# Patient Record
Sex: MALE | Race: WHITE | Employment: FULL TIME | URBAN - NONMETROPOLITAN AREA
[De-identification: names, ages, dates, MRNs, and addresses within clinical notes are randomized per-mention and may not be internally consistent; named-entity substitution may affect disease eponyms.]

---

## 2017-04-13 ENCOUNTER — OFFICE VISIT (OUTPATIENT)
Dept: PRIMARY CARE CLINIC | Age: 32
End: 2017-04-13
Payer: COMMERCIAL

## 2017-04-13 VITALS
BODY MASS INDEX: 31.49 KG/M2 | OXYGEN SATURATION: 97 % | RESPIRATION RATE: 18 BRPM | HEIGHT: 65 IN | WEIGHT: 189 LBS | DIASTOLIC BLOOD PRESSURE: 70 MMHG | SYSTOLIC BLOOD PRESSURE: 128 MMHG | HEART RATE: 85 BPM

## 2017-04-13 DIAGNOSIS — K52.9 CHRONIC DIARRHEA: Primary | ICD-10-CM

## 2017-04-13 DIAGNOSIS — Z76.89 ENCOUNTER TO ESTABLISH CARE WITH NEW DOCTOR: ICD-10-CM

## 2017-04-13 PROCEDURE — 99204 OFFICE O/P NEW MOD 45 MIN: CPT | Performed by: INTERNAL MEDICINE

## 2017-04-13 RX ORDER — COLESEVELAM 180 1/1
1875 TABLET ORAL
COMMUNITY
End: 2017-04-13 | Stop reason: SDUPTHER

## 2017-04-13 RX ORDER — LANOLIN ALCOHOL/MO/W.PET/CERES
10 CREAM (GRAM) TOPICAL NIGHTLY PRN
COMMUNITY

## 2017-04-13 RX ORDER — COLESEVELAM 180 1/1
1875 TABLET ORAL
Qty: 180 TABLET | Refills: 5 | Status: SHIPPED | OUTPATIENT
Start: 2017-04-13 | End: 2017-04-18 | Stop reason: SDUPTHER

## 2017-04-13 ASSESSMENT — ENCOUNTER SYMPTOMS
BACK PAIN: 0
DIARRHEA: 1
SHORTNESS OF BREATH: 0
VOMITING: 0
NAUSEA: 0
CONSTIPATION: 0

## 2017-04-18 RX ORDER — COLESEVELAM 180 1/1
1875 TABLET ORAL 2 TIMES DAILY WITH MEALS
Qty: 540 TABLET | Refills: 3 | Status: SHIPPED | OUTPATIENT
Start: 2017-04-18 | End: 2017-08-23 | Stop reason: SDUPTHER

## 2017-06-22 ENCOUNTER — OFFICE VISIT (OUTPATIENT)
Dept: PRIMARY CARE CLINIC | Age: 32
End: 2017-06-22
Payer: COMMERCIAL

## 2017-06-22 VITALS
RESPIRATION RATE: 18 BRPM | HEIGHT: 66 IN | HEART RATE: 80 BPM | WEIGHT: 188.8 LBS | BODY MASS INDEX: 30.34 KG/M2 | TEMPERATURE: 98.5 F | SYSTOLIC BLOOD PRESSURE: 128 MMHG | DIASTOLIC BLOOD PRESSURE: 70 MMHG | OXYGEN SATURATION: 98 %

## 2017-06-22 DIAGNOSIS — Z72.0 TOBACCO USE: Primary | ICD-10-CM

## 2017-06-22 PROCEDURE — 99213 OFFICE O/P EST LOW 20 MIN: CPT | Performed by: INTERNAL MEDICINE

## 2017-06-22 RX ORDER — VARENICLINE TARTRATE 25 MG
KIT ORAL
Qty: 53 TABLET | Refills: 1 | Status: SHIPPED | OUTPATIENT
Start: 2017-06-22 | End: 2018-02-28

## 2017-06-23 ASSESSMENT — ENCOUNTER SYMPTOMS
CONSTIPATION: 0
BACK PAIN: 0
VOMITING: 0
NAUSEA: 0
DIARRHEA: 1
SHORTNESS OF BREATH: 0

## 2017-08-23 RX ORDER — COLESEVELAM 180 1/1
1875 TABLET ORAL 2 TIMES DAILY WITH MEALS
Qty: 540 TABLET | Refills: 3 | Status: SHIPPED | OUTPATIENT
Start: 2017-08-23 | End: 2017-12-06 | Stop reason: SDUPTHER

## 2017-09-19 DIAGNOSIS — Z13.9 ENCOUNTER FOR SCREENING: Primary | ICD-10-CM

## 2017-12-06 ENCOUNTER — OFFICE VISIT (OUTPATIENT)
Dept: PRIMARY CARE CLINIC | Age: 32
End: 2017-12-06
Payer: COMMERCIAL

## 2017-12-06 VITALS
DIASTOLIC BLOOD PRESSURE: 68 MMHG | HEIGHT: 65 IN | HEART RATE: 97 BPM | SYSTOLIC BLOOD PRESSURE: 134 MMHG | TEMPERATURE: 98.2 F | WEIGHT: 202.4 LBS | BODY MASS INDEX: 33.72 KG/M2 | OXYGEN SATURATION: 98 %

## 2017-12-06 DIAGNOSIS — F51.01 PRIMARY INSOMNIA: ICD-10-CM

## 2017-12-06 DIAGNOSIS — K91.5 POST-CHOLECYSTECTOMY SYNDROME: ICD-10-CM

## 2017-12-06 DIAGNOSIS — Z72.0 TOBACCO USE: Primary | ICD-10-CM

## 2017-12-06 DIAGNOSIS — Z72.0 TOBACCO USE: ICD-10-CM

## 2017-12-06 LAB
ALBUMIN SERPL-MCNC: 4.6 G/DL (ref 3.5–5.2)
ALP BLD-CCNC: 66 U/L (ref 40–130)
ALT SERPL-CCNC: 18 U/L (ref 5–41)
ANION GAP SERPL CALCULATED.3IONS-SCNC: 12 MMOL/L (ref 7–19)
AST SERPL-CCNC: 20 U/L (ref 5–40)
BILIRUB SERPL-MCNC: 1.3 MG/DL (ref 0.2–1.2)
BILIRUBIN URINE: NEGATIVE
BLOOD, URINE: NEGATIVE
BUN BLDV-MCNC: 14 MG/DL (ref 6–20)
CALCIUM SERPL-MCNC: 9.3 MG/DL (ref 8.6–10)
CHLORIDE BLD-SCNC: 103 MMOL/L (ref 98–111)
CHOLESTEROL, TOTAL: 154 MG/DL (ref 160–199)
CLARITY: CLEAR
CO2: 27 MMOL/L (ref 22–29)
COLOR: YELLOW
CREAT SERPL-MCNC: 0.9 MG/DL (ref 0.5–1.2)
GFR NON-AFRICAN AMERICAN: >60
GLUCOSE BLD-MCNC: 95 MG/DL (ref 74–109)
GLUCOSE URINE: NEGATIVE MG/DL
HBA1C MFR BLD: 4.9 %
HDLC SERPL-MCNC: 63 MG/DL (ref 55–121)
KETONES, URINE: NEGATIVE MG/DL
LDL CHOLESTEROL CALCULATED: 70 MG/DL
LEUKOCYTE ESTERASE, URINE: NEGATIVE
NITRITE, URINE: NEGATIVE
PH UA: 6.5
POTASSIUM SERPL-SCNC: 4 MMOL/L (ref 3.5–5)
PROTEIN UA: NEGATIVE MG/DL
SODIUM BLD-SCNC: 142 MMOL/L (ref 136–145)
SPECIFIC GRAVITY UA: 1.02
TOTAL PROTEIN: 7.4 G/DL (ref 6.6–8.7)
TRIGL SERPL-MCNC: 103 MG/DL (ref 0–149)
UROBILINOGEN, URINE: 1 E.U./DL

## 2017-12-06 PROCEDURE — 99213 OFFICE O/P EST LOW 20 MIN: CPT | Performed by: INTERNAL MEDICINE

## 2017-12-06 RX ORDER — COLESEVELAM 180 1/1
1875 TABLET ORAL 2 TIMES DAILY WITH MEALS
Qty: 540 TABLET | Refills: 3 | Status: SHIPPED | OUTPATIENT
Start: 2017-12-06 | End: 2018-09-18 | Stop reason: SDUPTHER

## 2017-12-06 RX ORDER — TRAZODONE HYDROCHLORIDE 100 MG/1
100 TABLET ORAL NIGHTLY
Qty: 30 TABLET | Refills: 5 | Status: SHIPPED | OUTPATIENT
Start: 2017-12-06 | End: 2018-02-22 | Stop reason: SDUPTHER

## 2017-12-06 ASSESSMENT — ENCOUNTER SYMPTOMS
SHORTNESS OF BREATH: 0
NAUSEA: 0
BACK PAIN: 0
CONSTIPATION: 0
VOMITING: 0

## 2017-12-06 NOTE — PROGRESS NOTES
nightly as needed       No current facility-administered medications for this visit. No Known Allergies    Health Maintenance   Topic Date Due    HIV screen  07/12/2002    DTaP/Tdap/Td vaccine (1 - Tdap) 07/12/2006    Pneumococcal med risk (1 of 1 - PPSV23) 07/12/2006    Flu vaccine (1) 09/01/2017       Subjective:      Review of Systems   Constitutional: Negative for activity change and fever. Respiratory: Negative for shortness of breath. Cardiovascular: Negative for chest pain and leg swelling. Gastrointestinal: Positive for diarrhea. Negative for constipation, nausea and vomiting. Genitourinary: Negative for difficulty urinating and dysuria. Musculoskeletal: Negative for back pain and neck pain. Neurological: Negative for dizziness and light-headedness. Psychiatric/Behavioral: Positive for sleep disturbance. Objective:     Physical Exam   Constitutional: He is oriented to person, place, and time. He appears well-developed and well-nourished. HENT:   Head: Normocephalic and atraumatic. Mouth/Throat: Oropharynx is clear and moist.   Eyes: Conjunctivae are normal. Pupils are equal, round, and reactive to light. Cardiovascular: Normal rate, regular rhythm and normal heart sounds. Pulmonary/Chest: Effort normal and breath sounds normal.   Neurological: He is alert and oriented to person, place, and time. Skin: Skin is warm and dry. Vitals reviewed. /68 (Site: Left Arm, Position: Sitting)   Pulse 97   Temp 98.2 °F (36.8 °C)   Ht 5' 5\" (1.651 m)   Wt 202 lb 6.4 oz (91.8 kg)   SpO2 98%   BMI 33.68 kg/m²     Assessment:      1. Tobacco use  Comprehensive Metabolic Panel    Lipid Panel    Hemoglobin A1C    Urinalysis   2. Post-cholecystectomy syndrome  Comprehensive Metabolic Panel    Lipid Panel    Hemoglobin A1C    Urinalysis   3.  Primary insomnia  Comprehensive Metabolic Panel    Lipid Panel    Hemoglobin A1C    Urinalysis             Plan:      Return in about 1 year (around 12/6/2018). Patient Instructions   Trazodone 100 mg at bedtime as needed for sleep. Follow up again in one year. Congratulations on stopping smoking!!!!!!      Orders Placed This Encounter   Procedures    Comprehensive Metabolic Panel     Standing Status:   Future     Number of Occurrences:   1     Standing Expiration Date:   12/6/2018    Lipid Panel     Standing Status:   Future     Number of Occurrences:   1     Standing Expiration Date:   12/6/2018     Order Specific Question:   Is Patient Fasting?/# of Hours     Answer:   yes    Hemoglobin A1C     Standing Status:   Future     Number of Occurrences:   1     Standing Expiration Date:   12/6/2018    Urinalysis     Standing Status:   Future     Number of Occurrences:   1     Standing Expiration Date:   12/6/2018     Orders Placed This Encounter   Medications    colesevelam (WELCHOL) 625 MG tablet     Sig: Take 3 tablets by mouth 2 times daily (with meals)     Dispense:  540 tablet     Refill:  3    traZODone (DESYREL) 100 MG tablet     Sig: Take 1 tablet by mouth nightly     Dispense:  30 tablet     Refill:  5       Patient given educational materials - see patient instructions. Discussed use, benefit, and side effects of prescribed medications. All patient questions answered. Pt voiced understanding. Reviewed health maintenance. Instructed to continue current medications, diet and exercise. Patient agreed with treatment plan. Follow up as directed.      Electronically signed by Duarte Ortega DO on 1/13/2018 at 1:52 PM

## 2017-12-11 ENCOUNTER — TELEPHONE (OUTPATIENT)
Dept: PRIMARY CARE CLINIC | Age: 32
End: 2017-12-11

## 2018-01-13 ASSESSMENT — ENCOUNTER SYMPTOMS: DIARRHEA: 1

## 2018-02-22 RX ORDER — TRAZODONE HYDROCHLORIDE 100 MG/1
100 TABLET ORAL NIGHTLY
Qty: 90 TABLET | Refills: 3 | Status: SHIPPED | OUTPATIENT
Start: 2018-02-22

## 2018-02-28 ENCOUNTER — OFFICE VISIT (OUTPATIENT)
Dept: PRIMARY CARE CLINIC | Age: 33
End: 2018-02-28
Payer: COMMERCIAL

## 2018-02-28 ENCOUNTER — HOSPITAL ENCOUNTER (OUTPATIENT)
Dept: GENERAL RADIOLOGY | Age: 33
Discharge: HOME | End: 2018-02-28
Payer: COMMERCIAL

## 2018-02-28 VITALS
HEART RATE: 74 BPM | BODY MASS INDEX: 33.45 KG/M2 | DIASTOLIC BLOOD PRESSURE: 80 MMHG | SYSTOLIC BLOOD PRESSURE: 122 MMHG | OXYGEN SATURATION: 99 % | WEIGHT: 200.8 LBS | TEMPERATURE: 98.6 F | HEIGHT: 65 IN

## 2018-02-28 DIAGNOSIS — R10.11 RIGHT UPPER QUADRANT ABDOMINAL PAIN: ICD-10-CM

## 2018-02-28 DIAGNOSIS — R10.11 RIGHT UPPER QUADRANT ABDOMINAL PAIN: Primary | ICD-10-CM

## 2018-02-28 PROCEDURE — 74018 RADEX ABDOMEN 1 VIEW: CPT

## 2018-02-28 PROCEDURE — 99213 OFFICE O/P EST LOW 20 MIN: CPT | Performed by: FAMILY MEDICINE

## 2018-02-28 ASSESSMENT — ENCOUNTER SYMPTOMS: ABDOMINAL PAIN: 1

## 2018-02-28 ASSESSMENT — PATIENT HEALTH QUESTIONNAIRE - PHQ9
2. FEELING DOWN, DEPRESSED OR HOPELESS: 0
SUM OF ALL RESPONSES TO PHQ QUESTIONS 1-9: 0
1. LITTLE INTEREST OR PLEASURE IN DOING THINGS: 0
SUM OF ALL RESPONSES TO PHQ9 QUESTIONS 1 & 2: 0

## 2018-02-28 NOTE — PROGRESS NOTES
Constitutional: He is oriented to person, place, and time. He appears well-developed and well-nourished. No distress. HENT:   Head: Normocephalic and atraumatic. Right Ear: External ear normal.   Left Ear: External ear normal.   Eyes: Conjunctivae are normal. No scleral icterus. Neck: No JVD present. Carotid bruit is not present. Cardiovascular: Normal rate, regular rhythm and normal heart sounds. Exam reveals no friction rub. No murmur heard. Pulmonary/Chest: Effort normal and breath sounds normal. No respiratory distress. He has no wheezes. Abdominal: Soft. Bowel sounds are normal. He exhibits no distension and no mass. There is tenderness (mild) in the right upper quadrant. There is no rebound and no guarding. Musculoskeletal: Normal range of motion. Lymphadenopathy:     He has no cervical adenopathy. Right cervical: No superficial cervical and no posterior cervical adenopathy present. Left cervical: No superficial cervical and no posterior cervical adenopathy present. Neurological: He is alert and oriented to person, place, and time. No cranial nerve deficit. Skin: Skin is warm and dry. No rash noted. No erythema. Psychiatric: He has a normal mood and affect. Thought content normal.   Vitals reviewed. Assessment    ICD-10-CM ICD-9-CM    1. Right upper quadrant abdominal pain R10.11 789.01 Unclear Etiology. Will obtain XR ABDOMEN (KUB) (SINGLE AP VIEW) . May start something for constipation vs reflux. Course will be determined by xray results. Plan    No orders of the defined types were placed in this encounter. Orders Placed This Encounter   Procedures    XR ABDOMEN (KUB) (SINGLE AP VIEW)        Return in about 1 month (around 3/28/2018) for Abdominal pain. There are no Patient Instructions on file for this visit.

## 2018-03-06 ENCOUNTER — TELEPHONE (OUTPATIENT)
Dept: PRIMARY CARE CLINIC | Age: 33
End: 2018-03-06

## 2018-03-06 RX ORDER — OMEPRAZOLE 20 MG/1
20 CAPSULE, DELAYED RELEASE ORAL DAILY
Qty: 30 CAPSULE | Refills: 3 | Status: SHIPPED | OUTPATIENT
Start: 2018-03-06 | End: 2018-06-20 | Stop reason: SDUPTHER

## 2018-03-30 ENCOUNTER — TELEPHONE (OUTPATIENT)
Dept: PRIMARY CARE CLINIC | Age: 33
End: 2018-03-30

## 2018-03-30 RX ORDER — AMOXICILLIN 250 MG/1
250 CAPSULE ORAL 3 TIMES DAILY
Qty: 30 CAPSULE | Refills: 0 | Status: SHIPPED | OUTPATIENT
Start: 2018-03-30 | End: 2018-04-09

## 2018-03-30 NOTE — TELEPHONE ENCOUNTER
Patient called and daughter diagnosed with strep throat,given amoxicillin and now he is starting with sore throat,congestion.  escribed rx

## 2018-06-20 RX ORDER — OMEPRAZOLE 20 MG/1
20 CAPSULE, DELAYED RELEASE ORAL DAILY
Qty: 90 CAPSULE | Refills: 2 | Status: SHIPPED | OUTPATIENT
Start: 2018-06-20 | End: 2021-01-08

## 2018-09-18 RX ORDER — COLESEVELAM 180 1/1
1875 TABLET ORAL 2 TIMES DAILY WITH MEALS
Qty: 540 TABLET | Refills: 2 | Status: SHIPPED | OUTPATIENT
Start: 2018-09-18

## 2018-11-14 ENCOUNTER — OFFICE VISIT (OUTPATIENT)
Dept: RETAIL CLINIC | Facility: CLINIC | Age: 33
End: 2018-11-14

## 2018-11-14 VITALS
DIASTOLIC BLOOD PRESSURE: 73 MMHG | HEART RATE: 77 BPM | SYSTOLIC BLOOD PRESSURE: 123 MMHG | OXYGEN SATURATION: 97 % | TEMPERATURE: 98.3 F

## 2018-11-14 DIAGNOSIS — J06.9 ACUTE URI: Primary | ICD-10-CM

## 2018-11-14 LAB
EXPIRATION DATE: NORMAL
EXPIRATION DATE: NORMAL
FLUAV AG NPH QL: NEGATIVE
FLUBV AG NPH QL: NEGATIVE
INTERNAL CONTROL: NORMAL
INTERNAL CONTROL: NORMAL
Lab: NORMAL
Lab: NORMAL
S PYO AG THROAT QL: NEGATIVE

## 2018-11-14 PROCEDURE — 87804 INFLUENZA ASSAY W/OPTIC: CPT | Performed by: NURSE PRACTITIONER

## 2018-11-14 PROCEDURE — 99213 OFFICE O/P EST LOW 20 MIN: CPT | Performed by: NURSE PRACTITIONER

## 2018-11-14 PROCEDURE — 87880 STREP A ASSAY W/OPTIC: CPT | Performed by: NURSE PRACTITIONER

## 2018-11-14 RX ORDER — OMEPRAZOLE 20 MG/1
20 CAPSULE, DELAYED RELEASE ORAL DAILY
Refills: 2 | COMMUNITY
Start: 2018-09-15 | End: 2019-05-06 | Stop reason: SDUPTHER

## 2018-11-14 RX ORDER — COLESEVELAM 180 1/1
1875 TABLET ORAL 2 TIMES DAILY WITH MEALS
Refills: 2 | COMMUNITY
Start: 2018-09-18 | End: 2020-11-10 | Stop reason: SDUPTHER

## 2018-11-14 RX ORDER — TRAZODONE HYDROCHLORIDE 100 MG/1
100 TABLET ORAL NIGHTLY
Refills: 3 | COMMUNITY
Start: 2018-09-01 | End: 2020-07-05

## 2018-11-14 NOTE — PROGRESS NOTES
"Subjective   Juan Gonzalez is a 33 y.o. male.     URI    This is a new problem. The current episode started in the past 7 days (3 days). The problem has been gradually worsening. There has been no fever. Associated symptoms include congestion, coughing and a sore throat. Pertinent negatives include no diarrhea, nausea or vomiting. He has tried nothing for the symptoms.    He reports his wife has had similar symptoms.  Has 14 weeks old baby at home.  States he \"wouldn't even be here if it weren't for baby\"    The following portions of the patient's history were reviewed and updated as appropriate: allergies, current medications, past family history, past medical history, past social history, past surgical history and problem list.    Review of Systems   Constitutional: Negative for fever.   HENT: Positive for congestion, postnasal drip and sore throat.    Respiratory: Positive for cough.    Gastrointestinal: Negative for diarrhea, nausea and vomiting.   Allergic/Immunologic: Negative for environmental allergies.   Neurological: Positive for headache.       Objective   Physical Exam   Constitutional: He appears well-developed and well-nourished. He does not appear ill. No distress.   HENT:   Right Ear: External ear normal. cerumen impaction is present.  Left Ear: External ear normal. Tympanic membrane is not erythematous and not bulging.   Nose: Right sinus exhibits no maxillary sinus tenderness and no frontal sinus tenderness. Left sinus exhibits no maxillary sinus tenderness and no frontal sinus tenderness.   Mouth/Throat: Posterior oropharyngeal erythema present. No oropharyngeal exudate. Tonsils are 2+ on the right. Tonsils are 2+ on the left. No tonsillar exudate.   Neck: Neck supple.   Cardiovascular: Normal rate, regular rhythm and normal heart sounds. Exam reveals no gallop and no friction rub.   No murmur heard.  Pulmonary/Chest: Effort normal and breath sounds normal. No stridor. No respiratory distress. He " has no decreased breath sounds. He has no wheezes. He has no rhonchi. He has no rales.   Lymphadenopathy:     He has no cervical adenopathy.   Neurological: He is alert.   Skin: Skin is warm and dry. He is not diaphoretic.         Assessment/Plan   Juan was seen today for uri.    Diagnoses and all orders for this visit:    Acute URI      Strep negative  Flu Negative      May use Dayquil/Nyquil for symptoms as needed  Increase fluid intake  May return if you develop fever for recheck  Warm salt water gargles as needed for sore throat  Do not over suppress cough  If no improvement over next 2-3 days or symptoms worsen, follow up with PCP

## 2018-11-14 NOTE — PATIENT INSTRUCTIONS
"May use Dayquil/Nyquil for symptoms as needed  Increase fluid intake  May return if you develop fever for recheck  Warm salt water gargles as needed for sore throat  Do not over suppress cough  If no improvement over next 2-3 days or symptoms worsen, follow up with PCP        Upper Respiratory Infection, Adult  Most upper respiratory infections (URIs) are a viral infection of the air passages leading to the lungs. A URI affects the nose, throat, and upper air passages. The most common type of URI is nasopharyngitis and is typically referred to as \"the common cold.\"  URIs run their course and usually go away on their own. Most of the time, a URI does not require medical attention, but sometimes a bacterial infection in the upper airways can follow a viral infection. This is called a secondary infection. Sinus and middle ear infections are common types of secondary upper respiratory infections.  Bacterial pneumonia can also complicate a URI. A URI can worsen asthma and chronic obstructive pulmonary disease (COPD). Sometimes, these complications can require emergency medical care and may be life threatening.  What are the causes?  Almost all URIs are caused by viruses. A virus is a type of germ and can spread from one person to another.  What increases the risk?  You may be at risk for a URI if:  · You smoke.  · You have chronic heart or lung disease.  · You have a weakened defense (immune) system.  · You are very young or very old.  · You have nasal allergies or asthma.  · You work in crowded or poorly ventilated areas.  · You work in health care facilities or schools.    What are the signs or symptoms?  Symptoms typically develop 2-3 days after you come in contact with a cold virus. Most viral URIs last 7-10 days. However, viral URIs from the influenza virus (flu virus) can last 14-18 days and are typically more severe. Symptoms may include:  · Runny or stuffy (congested) nose.  · Sneezing.  · Cough.  · Sore " throat.  · Headache.  · Fatigue.  · Fever.  · Loss of appetite.  · Pain in your forehead, behind your eyes, and over your cheekbones (sinus pain).  · Muscle aches.    How is this diagnosed?  Your health care provider may diagnose a URI by:  · Physical exam.  · Tests to check that your symptoms are not due to another condition such as:  ? Strep throat.  ? Sinusitis.  ? Pneumonia.  ? Asthma.    How is this treated?  A URI goes away on its own with time. It cannot be cured with medicines, but medicines may be prescribed or recommended to relieve symptoms. Medicines may help:  · Reduce your fever.  · Reduce your cough.  · Relieve nasal congestion.    Follow these instructions at home:  · Take medicines only as directed by your health care provider.  · Gargle warm saltwater or take cough drops to comfort your throat as directed by your health care provider.  · Use a warm mist humidifier or inhale steam from a shower to increase air moisture. This may make it easier to breathe.  · Drink enough fluid to keep your urine clear or pale yellow.  · Eat soups and other clear broths and maintain good nutrition.  · Rest as needed.  · Return to work when your temperature has returned to normal or as your health care provider advises. You may need to stay home longer to avoid infecting others. You can also use a face mask and careful hand washing to prevent spread of the virus.  · Increase the usage of your inhaler if you have asthma.  · Do not use any tobacco products, including cigarettes, chewing tobacco, or electronic cigarettes. If you need help quitting, ask your health care provider.  How is this prevented?  The best way to protect yourself from getting a cold is to practice good hygiene.  · Avoid oral or hand contact with people with cold symptoms.  · Wash your hands often if contact occurs.    There is no clear evidence that vitamin C, vitamin E, echinacea, or exercise reduces the chance of developing a cold. However, it is  always recommended to get plenty of rest, exercise, and practice good nutrition.  Contact a health care provider if:  · You are getting worse rather than better.  · Your symptoms are not controlled by medicine.  · You have chills.  · You have worsening shortness of breath.  · You have brown or red mucus.  · You have yellow or brown nasal discharge.  · You have pain in your face, especially when you bend forward.  · You have a fever.  · You have swollen neck glands.  · You have pain while swallowing.  · You have white areas in the back of your throat.  Get help right away if:  · You have severe or persistent:  ? Headache.  ? Ear pain.  ? Sinus pain.  ? Chest pain.  · You have chronic lung disease and any of the following:  ? Wheezing.  ? Prolonged cough.  ? Coughing up blood.  ? A change in your usual mucus.  · You have a stiff neck.  · You have changes in your:  ? Vision.  ? Hearing.  ? Thinking.  ? Mood.  This information is not intended to replace advice given to you by your health care provider. Make sure you discuss any questions you have with your health care provider.  Document Released: 06/13/2002 Document Revised: 08/20/2017 Document Reviewed: 03/25/2015  [employer] Interactive Patient Education © 2018 [employer]

## 2019-02-27 ENCOUNTER — OFFICE VISIT (OUTPATIENT)
Dept: RETAIL CLINIC | Facility: CLINIC | Age: 34
End: 2019-02-27

## 2019-02-27 VITALS
TEMPERATURE: 98.7 F | DIASTOLIC BLOOD PRESSURE: 74 MMHG | OXYGEN SATURATION: 98 % | HEART RATE: 84 BPM | RESPIRATION RATE: 18 BRPM | SYSTOLIC BLOOD PRESSURE: 116 MMHG

## 2019-02-27 DIAGNOSIS — J06.9 UPPER RESPIRATORY VIRUS: Primary | ICD-10-CM

## 2019-02-27 PROCEDURE — 87804 INFLUENZA ASSAY W/OPTIC: CPT | Performed by: NURSE PRACTITIONER

## 2019-02-27 PROCEDURE — 99213 OFFICE O/P EST LOW 20 MIN: CPT | Performed by: NURSE PRACTITIONER

## 2019-02-27 PROCEDURE — 87880 STREP A ASSAY W/OPTIC: CPT | Performed by: NURSE PRACTITIONER

## 2019-02-27 NOTE — PROGRESS NOTES
Chief Complaint   Patient presents with   • Sore Throat     Subjective   Juan Gonzalez is a 33 y.o. male who presents to the clinic today with complaints sore throat and cold symptoms. He wanted to make sure he does not have anything contagious.   Sore Throat    This is a new problem. The current episode started yesterday. The problem has been gradually worsening. Neither side of throat is experiencing more pain than the other. There has been no fever. The pain is moderate. Associated symptoms include congestion and coughing. Pertinent negatives include no abdominal pain, diarrhea, drooling, ear discharge, ear pain, headaches, hoarse voice, plugged ear sensation, neck pain, shortness of breath, stridor, swollen glands, trouble swallowing or vomiting. He has had no exposure to strep or mono. He has tried nothing for the symptoms.         Current Outpatient Medications:   •  colesevelam (WELCHOL) 625 MG tablet, TAKE 3 TABLETS BY MOUTH 2 TIMES DAILY (WITH MEALS), Disp: , Rfl: 2  •  omeprazole (priLOSEC) 20 MG capsule, Take 20 mg by mouth Daily., Disp: , Rfl: 2  •  traZODone (DESYREL) 100 MG tablet, Take 100 mg by mouth Every Night., Disp: , Rfl: 3    Allergies:  Patient has no known allergies.    Past Medical History:   Diagnosis Date   • Acid reflux      Past Surgical History:   Procedure Laterality Date   • CHOLECYSTECTOMY       No family history on file.  Social History     Tobacco Use   • Smoking status: Former Smoker   • Smokeless tobacco: Never Used   Substance Use Topics   • Alcohol use: Yes     Frequency: Never     Binge frequency: Daily or almost daily     Comment: 1-2   • Drug use: No       Review of Systems  Review of Systems   HENT: Positive for congestion, postnasal drip, rhinorrhea, sinus pressure, sneezing and sore throat. Negative for drooling, ear discharge, ear pain, hoarse voice, sinus pain and trouble swallowing.    Respiratory: Positive for cough. Negative for shortness of breath and stridor.     Gastrointestinal: Negative for abdominal pain, diarrhea and vomiting.   Musculoskeletal: Negative for neck pain.   Neurological: Negative for headaches.   Hematological: Negative for adenopathy.       Objective   /74 (BP Location: Left arm, Patient Position: Sitting, Cuff Size: Adult)   Pulse 84   Temp 98.7 °F (37.1 °C) (Axillary)   Resp 18   SpO2 98%       Physical Exam   Constitutional: He is oriented to person, place, and time. He appears well-developed and well-nourished.   HENT:   Head: Normocephalic and atraumatic.   Right Ear: External ear normal. No decreased hearing (cerumen impaction) is noted.   Left Ear: Hearing and external ear normal. No decreased hearing (cerumen impaction) is noted.   Nose: Mucosal edema and rhinorrhea present. Right sinus exhibits no maxillary sinus tenderness and no frontal sinus tenderness. Left sinus exhibits no maxillary sinus tenderness and no frontal sinus tenderness.   Mouth/Throat: Uvula is midline and mucous membranes are normal. Posterior oropharyngeal erythema present. No oropharyngeal exudate, posterior oropharyngeal edema or tonsillar abscesses. Tonsils are 1+ on the right. Tonsils are 1+ on the left. No tonsillar exudate.   Eyes: Pupils are equal, round, and reactive to light.   Neck: Normal range of motion. Neck supple.   Cardiovascular: Normal rate, regular rhythm and normal heart sounds.   Pulmonary/Chest: Effort normal and breath sounds normal.   Lymphadenopathy:     He has no cervical adenopathy.   Neurological: He is alert and oriented to person, place, and time.   Skin: Skin is warm and dry.   Psychiatric: He has a normal mood and affect.   Vitals reviewed.      Assessment/Plan     Juan was seen today for sore throat.    Diagnoses and all orders for this visit:    Upper respiratory virus  -     POCT Influenza A/B  -     POCT rapid strep A          Drink plenty of fluids and rest  Gargle with warm salty water for sore throat  Acetaminophen or  ibuprofen for pain or fever > 101.   Follow up symptoms persist > 7-10 days and before if symptoms worsen

## 2019-02-27 NOTE — PATIENT INSTRUCTIONS
"    Drink plenty of fluids and rest  Gargle with warm salty water for sore throat  Acetaminophen or ibuprofen for pain or fever > 101.   Follow up symptoms persist > 7-10 days and before if symptoms worsen    Upper Respiratory Infection, Adult  Most upper respiratory infections (URIs) are a viral infection of the air passages leading to the lungs. A URI affects the nose, throat, and upper air passages. The most common type of URI is nasopharyngitis and is typically referred to as \"the common cold.\"  URIs run their course and usually go away on their own. Most of the time, a URI does not require medical attention, but sometimes a bacterial infection in the upper airways can follow a viral infection. This is called a secondary infection. Sinus and middle ear infections are common types of secondary upper respiratory infections.  Bacterial pneumonia can also complicate a URI. A URI can worsen asthma and chronic obstructive pulmonary disease (COPD). Sometimes, these complications can require emergency medical care and may be life threatening.  What are the causes?  Almost all URIs are caused by viruses. A virus is a type of germ and can spread from one person to another.  What increases the risk?  You may be at risk for a URI if:  · You smoke.  · You have chronic heart or lung disease.  · You have a weakened defense (immune) system.  · You are very young or very old.  · You have nasal allergies or asthma.  · You work in crowded or poorly ventilated areas.  · You work in health care facilities or schools.    What are the signs or symptoms?  Symptoms typically develop 2-3 days after you come in contact with a cold virus. Most viral URIs last 7-10 days. However, viral URIs from the influenza virus (flu virus) can last 14-18 days and are typically more severe. Symptoms may include:  · Runny or stuffy (congested) nose.  · Sneezing.  · Cough.  · Sore throat.  · Headache.  · Fatigue.  · Fever.  · Loss of appetite.  · Pain in " your forehead, behind your eyes, and over your cheekbones (sinus pain).  · Muscle aches.    How is this diagnosed?  Your health care provider may diagnose a URI by:  · Physical exam.  · Tests to check that your symptoms are not due to another condition such as:  ? Strep throat.  ? Sinusitis.  ? Pneumonia.  ? Asthma.    How is this treated?  A URI goes away on its own with time. It cannot be cured with medicines, but medicines may be prescribed or recommended to relieve symptoms. Medicines may help:  · Reduce your fever.  · Reduce your cough.  · Relieve nasal congestion.    Follow these instructions at home:  · Take medicines only as directed by your health care provider.  · Gargle warm saltwater or take cough drops to comfort your throat as directed by your health care provider.  · Use a warm mist humidifier or inhale steam from a shower to increase air moisture. This may make it easier to breathe.  · Drink enough fluid to keep your urine clear or pale yellow.  · Eat soups and other clear broths and maintain good nutrition.  · Rest as needed.  · Return to work when your temperature has returned to normal or as your health care provider advises. You may need to stay home longer to avoid infecting others. You can also use a face mask and careful hand washing to prevent spread of the virus.  · Increase the usage of your inhaler if you have asthma.  · Do not use any tobacco products, including cigarettes, chewing tobacco, or electronic cigarettes. If you need help quitting, ask your health care provider.  How is this prevented?  The best way to protect yourself from getting a cold is to practice good hygiene.  · Avoid oral or hand contact with people with cold symptoms.  · Wash your hands often if contact occurs.    There is no clear evidence that vitamin C, vitamin E, echinacea, or exercise reduces the chance of developing a cold. However, it is always recommended to get plenty of rest, exercise, and practice good  nutrition.  Contact a health care provider if:  · You are getting worse rather than better.  · Your symptoms are not controlled by medicine.  · You have chills.  · You have worsening shortness of breath.  · You have brown or red mucus.  · You have yellow or brown nasal discharge.  · You have pain in your face, especially when you bend forward.  · You have a fever.  · You have swollen neck glands.  · You have pain while swallowing.  · You have white areas in the back of your throat.  Get help right away if:  · You have severe or persistent:  ? Headache.  ? Ear pain.  ? Sinus pain.  ? Chest pain.  · You have chronic lung disease and any of the following:  ? Wheezing.  ? Prolonged cough.  ? Coughing up blood.  ? A change in your usual mucus.  · You have a stiff neck.  · You have changes in your:  ? Vision.  ? Hearing.  ? Thinking.  ? Mood.  This information is not intended to replace advice given to you by your health care provider. Make sure you discuss any questions you have with your health care provider.  Document Released: 06/13/2002 Document Revised: 08/20/2017 Document Reviewed: 03/25/2015  [employer] Interactive Patient Education © 2018 [employer]

## 2019-04-26 ENCOUNTER — OFFICE VISIT (OUTPATIENT)
Dept: INTERNAL MEDICINE | Facility: CLINIC | Age: 34
End: 2019-04-26

## 2019-04-26 VITALS
SYSTOLIC BLOOD PRESSURE: 116 MMHG | WEIGHT: 196.44 LBS | HEIGHT: 65 IN | OXYGEN SATURATION: 98 % | BODY MASS INDEX: 32.73 KG/M2 | HEART RATE: 70 BPM | RESPIRATION RATE: 12 BRPM | TEMPERATURE: 98.5 F | DIASTOLIC BLOOD PRESSURE: 80 MMHG

## 2019-04-26 DIAGNOSIS — Z30.2 ENCOUNTER FOR VASECTOMY: ICD-10-CM

## 2019-04-26 DIAGNOSIS — Z72.0 TOBACCO ABUSE: ICD-10-CM

## 2019-04-26 DIAGNOSIS — F41.1 GAD (GENERALIZED ANXIETY DISORDER): Primary | ICD-10-CM

## 2019-04-26 DIAGNOSIS — E66.9 OBESITY (BMI 30-39.9): ICD-10-CM

## 2019-04-26 PROBLEM — K21.9 GERD (GASTROESOPHAGEAL REFLUX DISEASE): Status: ACTIVE | Noted: 2019-04-26

## 2019-04-26 PROBLEM — L29.9 ITCHY SCALP: Status: ACTIVE | Noted: 2019-04-26

## 2019-04-26 PROBLEM — R19.7 DIARRHEA: Status: ACTIVE | Noted: 2019-04-26

## 2019-04-26 PROCEDURE — 99204 OFFICE O/P NEW MOD 45 MIN: CPT | Performed by: FAMILY MEDICINE

## 2019-04-26 RX ORDER — CLOBETASOL PROPIONATE 0.5 MG/G
OINTMENT TOPICAL
COMMUNITY
Start: 2019-04-18 | End: 2019-05-23

## 2019-04-26 RX ORDER — BUPROPION HYDROCHLORIDE 150 MG/1
150 TABLET ORAL EVERY MORNING
Qty: 30 TABLET | Refills: 1 | Status: SHIPPED | OUTPATIENT
Start: 2019-04-26 | End: 2019-05-21 | Stop reason: SDUPTHER

## 2019-04-26 RX ORDER — FLUOCINONIDE TOPICAL SOLUTION USP, 0.05% 0.5 MG/ML
SOLUTION TOPICAL
COMMUNITY
Start: 2019-04-18 | End: 2019-05-23

## 2019-04-26 NOTE — PATIENT INSTRUCTIONS
Bupropion tablets (Depression/Mood Disorders)  What is this medicine?  BUPROPION (byoo PROE pee on) is used to treat depression.  This medicine may be used for other purposes; ask your health care provider or pharmacist if you have questions.  COMMON BRAND NAME(S): Wellbutrin  What should I tell my health care provider before I take this medicine?  They need to know if you have any of these conditions:  -an eating disorder, such as anorexia or bulimia  -bipolar disorder or psychosis  -diabetes or high blood sugar, treated with medication  -glaucoma  -heart disease, previous heart attack, or irregular heart beat  -head injury or brain tumor  -high blood pressure  -kidney or liver disease  -seizures  -suicidal thoughts or a previous suicide attempt  -Tourette's syndrome  -weight loss  -an unusual or allergic reaction to bupropion, other medicines, foods, dyes, or preservatives  -breast-feeding  -pregnant or trying to become pregnant  How should I use this medicine?  Take this medicine by mouth with a glass of water. Follow the directions on the prescription label. You can take it with or without food. If it upsets your stomach, take it with food. Take your medicine at regular intervals. Do not take your medicine more often than directed. Do not stop taking this medicine suddenly except upon the advice of your doctor. Stopping this medicine too quickly may cause serious side effects or your condition may worsen.  A special MedGuide will be given to you by the pharmacist with each prescription and refill. Be sure to read this information carefully each time.  Talk to your pediatrician regarding the use of this medicine in children. Special care may be needed.  Overdosage: If you think you have taken too much of this medicine contact a poison control center or emergency room at once.  NOTE: This medicine is only for you. Do not share this medicine with others.  What if I miss a dose?  If you miss a dose, take it as soon  as you can. If it is less than four hours to your next dose, take only that dose and skip the missed dose. Do not take double or extra doses.  What may interact with this medicine?  Do not take this medicine with any of the following medications:  -linezolid  -MAOIs like Azilect, Carbex, Eldepryl, Marplan, Nardil, and Parnate  -methylene blue (injected into a vein)  -other medicines that contain bupropion like Zyban  This medicine may also interact with the following medications:  -alcohol  -certain medicines for anxiety or sleep  -certain medicines for blood pressure like metoprolol, propranolol  -certain medicines for depression or psychotic disturbances  -certain medicines for HIV or AIDS like efavirenz, lopinavir, nelfinavir, ritonavir  -certain medicines for irregular heart beat like propafenone, flecainide  -certain medicines for Parkinson's disease like amantadine, levodopa  -certain medicines for seizures like carbamazepine, phenytoin, phenobarbital  -cimetidine  -clopidogrel  -cyclophosphamide  -digoxin  -furazolidone  -isoniazid  -nicotine  -orphenadrine  -procarbazine  -steroid medicines like prednisone or cortisone  -stimulant medicines for attention disorders, weight loss, or to stay awake  -tamoxifen  -theophylline  -thiotepa  -ticlopidine  -tramadol  -warfarin  This list may not describe all possible interactions. Give your health care provider a list of all the medicines, herbs, non-prescription drugs, or dietary supplements you use. Also tell them if you smoke, drink alcohol, or use illegal drugs. Some items may interact with your medicine.  What should I watch for while using this medicine?  Tell your doctor if your symptoms do not get better or if they get worse. Visit your doctor or health care professional for regular checks on your progress. Because it may take several weeks to see the full effects of this medicine, it is important to continue your treatment as prescribed by your  doctor.  Patients and their families should watch out for new or worsening thoughts of suicide or depression. Also watch out for sudden changes in feelings such as feeling anxious, agitated, panicky, irritable, hostile, aggressive, impulsive, severely restless, overly excited and hyperactive, or not being able to sleep. If this happens, especially at the beginning of treatment or after a change in dose, call your health care professional.  Avoid alcoholic drinks while taking this medicine. Drinking excessive alcoholic beverages, using sleeping or anxiety medicines, or quickly stopping the use of these agents while taking this medicine may increase your risk for a seizure.  Do not drive or use heavy machinery until you know how this medicine affects you. This medicine can impair your ability to perform these tasks.  Do not take this medicine close to bedtime. It may prevent you from sleeping.  Your mouth may get dry. Chewing sugarless gum or sucking hard candy, and drinking plenty of water may help. Contact your doctor if the problem does not go away or is severe.  What side effects may I notice from receiving this medicine?  Side effects that you should report to your doctor or health care professional as soon as possible:  -allergic reactions like skin rash, itching or hives, swelling of the face, lips, or tongue  -breathing problems  -changes in vision  -confusion  -elevated mood, decreased need for sleep, racing thoughts, impulsive behavior  -fast or irregular heartbeat  -hallucinations, loss of contact with reality  -increased blood pressure  -redness, blistering, peeling or loosening of the skin, including inside the mouth  -seizures  -suicidal thoughts or other mood changes  -unusually weak or tired  -vomiting  Side effects that usually do not require medical attention (report to your doctor or health care professional if they continue or are bothersome):  -constipation  -headache  -loss of  appetite  -nausea  -tremors  -weight loss  This list may not describe all possible side effects. Call your doctor for medical advice about side effects. You may report side effects to FDA at 4-829-WKQ-7439.  Where should I keep my medicine?  Keep out of the reach of children.  Store at room temperature between 20 and 25 degrees C (68 and 77 degrees F), away from direct sunlight and moisture. Keep tightly closed. Throw away any unused medicine after the expiration date.  NOTE: This sheet is a summary. It may not cover all possible information. If you have questions about this medicine, talk to your doctor, pharmacist, or health care provider.  © 2019 Elsevier/Gold Standard (2017-06-09 13:44:21)

## 2019-04-26 NOTE — PROGRESS NOTES
CC:   Chief Complaint   Patient presents with   • Establish Care     Patient would like a referral to Urology for a vasectomy   • Anxiety       History:  Juan Gonzalez is a 33 y.o. male who presents today for evaluation of the above problems.      Here to discuss anxiety that has been longstanding, constant worry with insomnia that is better with trazodone. Says it is affecting work. Also gets bowel urgency and loose stool when he is more anxious, currently treats with welchol.     PHQ-9 11  ANASTACIO-7 21    Previously quit smoking, now down to 2-3 cigarettes daily from 8 a few months ago.     Needs referral to urology for vasectomy    ROS:  Review of Systems   Psychiatric/Behavioral: Positive for behavioral problems, decreased concentration and sleep disturbance. The patient is nervous/anxious.    All other systems reviewed and are negative.      No Known Allergies  Past Medical History:   Diagnosis Date   • Acid reflux      Past Surgical History:   Procedure Laterality Date   • CHOLECYSTECTOMY       Family History   Problem Relation Age of Onset   • Aneurysm Maternal Grandmother    • Aneurysm Paternal Grandmother    • Dementia Paternal Grandmother    • Heart disease Paternal Grandfather       reports that he has been smoking cigarettes.  He has a 1.25 pack-year smoking history. He has never used smokeless tobacco. He reports that he drinks alcohol. He reports that he does not use drugs.      Current Outpatient Medications:   •  clobetasol (TEMOVATE) 0.05 % ointment, , Disp: , Rfl:   •  colesevelam (WELCHOL) 625 MG tablet, TAKE 3 TABLETS BY MOUTH 2 TIMES DAILY (WITH MEALS), Disp: , Rfl: 2  •  fluocinonide (LIDEX) 0.05 % external solution, , Disp: , Rfl:   •  omeprazole (priLOSEC) 20 MG capsule, Take 20 mg by mouth Daily., Disp: , Rfl: 2  •  traZODone (DESYREL) 100 MG tablet, Take 100 mg by mouth Every Night., Disp: , Rfl: 3    OBJECTIVE:  /80 (BP Location: Left arm, Patient Position: Sitting, Cuff Size: Adult)    "Pulse 70   Temp 98.5 °F (36.9 °C) (Temporal)   Resp 12   Ht 165.1 cm (65\")   Wt 89.1 kg (196 lb 7 oz)   SpO2 98%   BMI 32.69 kg/m²    Physical Exam   Constitutional: He is oriented to person, place, and time. No distress.   HENT:   Head: Normocephalic and atraumatic.   Nose: Nose normal.   Eyes: Conjunctivae are normal. Right eye exhibits no discharge. Left eye exhibits no discharge. No scleral icterus.   Neck: No tracheal deviation present.   Cardiovascular: Normal rate, regular rhythm and normal heart sounds. Exam reveals no gallop and no friction rub.   No murmur heard.  Pulmonary/Chest: Effort normal and breath sounds normal. No respiratory distress. He has no wheezes. He has no rales.   Neurological: He is alert and oriented to person, place, and time.   Skin: Skin is warm and dry. He is not diaphoretic. No pallor.   Psychiatric:   Mild anxiety   Nursing note and vitals reviewed.      Assessment/Plan    Problem List Items Addressed This Visit        Digestive    Obesity (BMI 30-39.9)     Obesity is newly identified.  Discussed the patient's BMI.  The BMI is above average; BMI management plan is completed.  General weight loss/lifestyle modification strategies discussed (CV exercise)            Other    ANASTACIO (generalized anxiety disorder) - Primary     Wellbutrin trial  Recommend exercise  F/u 4 weeks         Relevant Medications    buPROPion XL (WELLBUTRIN XL) 150 MG 24 hr tablet    Tobacco abuse      Other Visit Diagnoses     Encounter for vasectomy        Relevant Orders    Ambulatory Referral to Urology          Patient's Body mass index is 32.69 kg/m². BMI is above normal parameters. Recommendations include: exercise counseling.      An After Visit Summary was printed and given to the patient at discharge.  Return in about 4 weeks (around 5/24/2019).         Yo Martinez D.O.  [facility]  Osteopathic Neuromusculoskeletal Medicine  4/26/2019  "

## 2019-04-26 NOTE — ASSESSMENT & PLAN NOTE
Obesity is newly identified.  Discussed the patient's BMI.  The BMI is above average; BMI management plan is completed.  General weight loss/lifestyle modification strategies discussed (CV exercise)

## 2019-05-05 RX ORDER — OMEPRAZOLE 20 MG/1
CAPSULE, DELAYED RELEASE ORAL
Qty: 90 CAPSULE | Refills: 2 | Status: CANCELLED | OUTPATIENT
Start: 2019-05-05

## 2019-05-06 RX ORDER — OMEPRAZOLE 20 MG/1
20 CAPSULE, DELAYED RELEASE ORAL DAILY
Qty: 30 CAPSULE | Refills: 2 | Status: SHIPPED | OUTPATIENT
Start: 2019-05-06 | End: 2019-08-01 | Stop reason: SDUPTHER

## 2019-05-21 DIAGNOSIS — F41.1 GAD (GENERALIZED ANXIETY DISORDER): ICD-10-CM

## 2019-05-21 RX ORDER — BUPROPION HYDROCHLORIDE 150 MG/1
150 TABLET ORAL EVERY MORNING
Qty: 90 TABLET | Refills: 0 | Status: SHIPPED | OUTPATIENT
Start: 2019-05-21 | End: 2019-08-13 | Stop reason: SDUPTHER

## 2019-05-23 ENCOUNTER — OFFICE VISIT (OUTPATIENT)
Dept: RETAIL CLINIC | Facility: CLINIC | Age: 34
End: 2019-05-23

## 2019-05-23 VITALS
SYSTOLIC BLOOD PRESSURE: 128 MMHG | OXYGEN SATURATION: 98 % | DIASTOLIC BLOOD PRESSURE: 74 MMHG | HEART RATE: 80 BPM | TEMPERATURE: 97.8 F | RESPIRATION RATE: 16 BRPM

## 2019-05-23 DIAGNOSIS — J06.9 VIRAL UPPER RESPIRATORY TRACT INFECTION: Primary | ICD-10-CM

## 2019-05-23 PROCEDURE — 99213 OFFICE O/P EST LOW 20 MIN: CPT | Performed by: NURSE PRACTITIONER

## 2019-05-23 RX ORDER — BROMPHENIRAMINE MALEATE, PSEUDOEPHEDRINE HYDROCHLORIDE, AND DEXTROMETHORPHAN HYDROBROMIDE 2; 30; 10 MG/5ML; MG/5ML; MG/5ML
5 SYRUP ORAL NIGHTLY PRN
Qty: 118 ML | Refills: 0 | Status: SHIPPED | OUTPATIENT
Start: 2019-05-23 | End: 2020-05-01

## 2019-05-23 NOTE — PROGRESS NOTES
Chief Complaint   Patient presents with   • Cough     Subjective   Juan Gonzalez is a 33 y.o. male who presents to the clinic today with complaints productive cough that started yesterday. His wife has been sick with bronchitis.   Cough   This is a new problem. The current episode started yesterday. The problem has been unchanged. The problem occurs every few minutes. The cough is non-productive. Associated symptoms include nasal congestion and postnasal drip. Pertinent negatives include no chest pain, chills, ear congestion, ear pain, fever, headaches, heartburn, hemoptysis, myalgias, rash, rhinorrhea, sore throat, shortness of breath, sweats, weight loss or wheezing. The symptoms are aggravated by lying down. He has tried OTC cough suppressant for the symptoms. The treatment provided mild relief. His past medical history is significant for environmental allergies. There is no history of asthma, bronchiectasis, bronchitis, COPD, emphysema or pneumonia.         Current Outpatient Medications:   •  buPROPion XL (WELLBUTRIN XL) 150 MG 24 hr tablet, Take 1 tablet by mouth Every Morning., Disp: 90 tablet, Rfl: 0  •  colesevelam (WELCHOL) 625 MG tablet, TAKE 3 TABLETS BY MOUTH 2 TIMES DAILY (WITH MEALS), Disp: , Rfl: 2  •  omeprazole (priLOSEC) 20 MG capsule, Take 1 capsule by mouth Daily., Disp: 30 capsule, Rfl: 2  •  traZODone (DESYREL) 100 MG tablet, Take 100 mg by mouth Every Night., Disp: , Rfl: 3  •  brompheniramine-pseudoephedrine-DM 30-2-10 MG/5ML syrup, Take 5 mL by mouth At Night As Needed for Congestion or Cough., Disp: 118 mL, Rfl: 0    Allergies:  Patient has no known allergies.    Past Medical History:   Diagnosis Date   • Acid reflux    • Allergic     improved      Past Surgical History:   Procedure Laterality Date   • CHOLECYSTECTOMY       Family History   Problem Relation Age of Onset   • Aneurysm Maternal Grandmother    • Aneurysm Paternal Grandmother    • Dementia Paternal Grandmother    • Heart  disease Paternal Grandfather      Social History     Tobacco Use   • Smoking status: Current Every Day Smoker     Packs/day: 0.25     Years: 5.00     Pack years: 1.25     Types: Cigarettes   • Smokeless tobacco: Never Used   Substance Use Topics   • Alcohol use: Yes     Frequency: Never     Binge frequency: Daily or almost daily     Comment: < 3 times weekly   • Drug use: No       Review of Systems  Review of Systems   Constitutional: Negative for chills, fever and weight loss.   HENT: Positive for postnasal drip. Negative for ear pain, rhinorrhea and sore throat.    Respiratory: Positive for cough. Negative for hemoptysis, shortness of breath and wheezing.    Cardiovascular: Negative for chest pain.   Gastrointestinal: Negative for heartburn.   Musculoskeletal: Negative for myalgias.   Skin: Negative for rash.   Allergic/Immunologic: Positive for environmental allergies.   Neurological: Negative for headaches.       Objective   /74 (BP Location: Left arm, Patient Position: Sitting, Cuff Size: Adult)   Pulse 80   Temp 97.8 °F (36.6 °C) (Tympanic)   Resp 16   SpO2 98%       Physical Exam   Constitutional: He is oriented to person, place, and time. He appears well-developed and well-nourished.   HENT:   Head: Normocephalic and atraumatic.   Right Ear: Hearing, tympanic membrane, external ear and ear canal normal.   Left Ear: Hearing, tympanic membrane, external ear and ear canal normal.   Nose: Nose normal. Right sinus exhibits no maxillary sinus tenderness and no frontal sinus tenderness. Left sinus exhibits no maxillary sinus tenderness and no frontal sinus tenderness.   Mouth/Throat: Uvula is midline and mucous membranes are normal. Posterior oropharyngeal erythema present. No oropharyngeal exudate, posterior oropharyngeal edema or tonsillar abscesses. Tonsils are 1+ on the right. Tonsils are 1+ on the left. No tonsillar exudate.   Eyes: Pupils are equal, round, and reactive to light.   Neck: Normal range  of motion. Neck supple. No JVD present. No tracheal deviation present. No thyromegaly present.   Cardiovascular: Normal rate, regular rhythm and normal heart sounds.   Pulmonary/Chest: Effort normal and breath sounds normal. No stridor. No respiratory distress. He has no wheezes. He has no rales. He exhibits no tenderness.   Lymphadenopathy:        Head (right side): No submental, no submandibular, no tonsillar, no preauricular, no posterior auricular and no occipital adenopathy present.        Head (left side): No submental, no submandibular, no tonsillar, no preauricular, no posterior auricular and no occipital adenopathy present.     He has no cervical adenopathy.   Neurological: He is alert and oriented to person, place, and time.   Skin: Skin is warm and dry.   Psychiatric: He has a normal mood and affect.   Vitals reviewed.      Assessment/Plan     Juan was seen today for cough.    Diagnoses and all orders for this visit:    Viral upper respiratory tract infection    Other orders  -     brompheniramine-pseudoephedrine-DM 30-2-10 MG/5ML syrup; Take 5 mL by mouth At Night As Needed for Congestion or Cough.      Drink plenty of fluids and rest  Symptoms can last from 7-10 days and if they persist follow up.

## 2019-05-23 NOTE — PATIENT INSTRUCTIONS
Drink plenty of fluids and rest  Symptoms can last from 7-10 days and if they persist follow up.           Symptom Relief for Viral Illnesses       1. DIAGNOSIS  2. GENERAL INSTRUCTIONS   · Cold or cough  · Middle ear fluid (Lobo Media with Effusion, OME)  · Flu  · Viral sore throat  · Bronchitis    You have been diagnosed with an illness caused by a virus.  Antibiotics do not work on viruses.  When antibiotics aren't needed, they won't help you, and the side effects could still hurt you.  The treatments prescribed below will help you feel better while your body fights off the virus.   · Drink extra water and fluids  · Use a cool mist vaporizer or saline nasal spray to relieve congestion  · For sore throats in older children and adults, use ice chips, sore throat spray, or lozenges  · Use honey to relieve cough.  Do not give honey to an infant younger than 1 year.      3. SPECIFIC MEDICINES  4. FOLLOW UP   Use over-the-counter medications specific to your symptoms. Use medicines according to the package instructions or as directed by your healthcare professional.  Stop the medication when the symptoms get better.   If not improved in 3-5 days, if new symptoms occur, or if you have other concerns, please call or return to the office for a recheck.         To learn more about antibiotic prescribing and use, visit www.cdc.gov/antibiotic-use        Upper Respiratory Infection, Adult  An upper respiratory infection (URI) is a common viral infection of the nose, throat, and upper air passages that lead to the lungs. The most common type of URI is the common cold. URIs usually get better on their own, without medical treatment.  What are the causes?  A URI is caused by a virus. You may catch a virus by:  · Breathing in droplets from an infected person's cough or sneeze.  · Touching something that has been exposed to the virus (contaminated) and then touching your mouth, nose, or eyes.    What increases the risk?  You are  more likely to get a URI if:  · You are very young or very old.  · It is michelle or winter.  · You have close contact with others, such as at a , school, or health care facility.  · You smoke.  · You have long-term (chronic) heart or lung disease.  · You have a weakened disease-fighting (immune) system.  · You have nasal allergies or asthma.  · You are experiencing a lot of stress.  · You work in an area that has poor air circulation.  · You have poor nutrition.    What are the signs or symptoms?  A URI usually involves some of the following symptoms:  · Runny or stuffy (congested) nose.  · Sneezing.  · Cough.  · Sore throat.  · Headache.  · Fatigue.  · Fever.  · Loss of appetite.  · Pain in your forehead, behind your eyes, and over your cheekbones (sinus pain).  · Muscle aches.  · Redness or irritation of the eyes.  · Pressure in the ears or face.    How is this diagnosed?  This condition may be diagnosed based on your medical history and symptoms, and a physical exam. Your health care provider may use a cotton swab to take a mucus sample from your nose (nasal swab). This sample can be tested to determine what virus is causing the illness.  How is this treated?  URIs usually get better on their own within 7-10 days. You can take steps at home to relieve your symptoms. Medicines cannot cure URIs, but your health care provider may recommend certain medicines to help relieve symptoms, such as:  · Over-the-counter cold medicines.  · Cough suppressants. Coughing is a type of defense against infection that helps to clear the respiratory system, so take these medicines only as recommended by your health care provider.  · Fever-reducing medicines.    Follow these instructions at home:  Activity  · Rest as needed.  · If you have a fever, stay home from work or school until your fever is gone or until your health care provider says you are no longer contagious. Your health care provider may have you wear a face mask to  prevent your infection from spreading.  Relieving symptoms  · Gargle with a salt-water mixture 3-4 times a day or as needed. To make a salt-water mixture, completely dissolve ½-1 tsp of salt in 1 cup of warm water.  · Use a cool-mist humidifier to add moisture to the air. This can help you breathe more easily.  Eating and drinking  · Drink enough fluid to keep your urine pale yellow.  · Eat soups and other clear broths.  General instructions  · Take over-the-counter and prescription medicines only as told by your health care provider. These include cold medicines, fever reducers, and cough suppressants.  · Do not use any products that contain nicotine or tobacco, such as cigarettes and e-cigarettes. If you need help quitting, ask your health care provider.  · Stay away from secondhand smoke.  · Stay up to date on all immunizations, including the yearly (annual) flu vaccine.  · Keep all follow-up visits as told by your health care provider. This is important.  How to prevent the spread of infection to others  · URIs can be passed from person to person (are contagious). To prevent the infection from spreading:  ? Wash your hands often with soap and water. If soap and water are not available, use hand .  ? Avoid touching your mouth, face, eyes, or nose.  ? Cough or sneeze into a tissue or your sleeve or elbow instead of into your hand or into the air.  Contact a health care provider if:  · You are getting worse instead of better.  · You have a fever or chills.  · Your mucus is brown or red.  · You have yellow or brown discharge coming from your nose.  · You have pain in your face, especially when you bend forward.  · You have swollen neck glands.  · You have pain while swallowing.  · You have white areas in the back of your throat.  Get help right away if:  · You have shortness of breath that gets worse.  · You have severe or persistent:  ? Headache.  ? Ear pain.  ? Sinus pain.  ? Chest pain.  · You have  chronic lung disease along with any of the following:  ? Wheezing.  ? Prolonged cough.  ? Coughing up blood.  ? A change in your usual mucus.  · You have a stiff neck.  · You have changes in your:  ? Vision.  ? Hearing.  ? Thinking.  ? Mood.  Summary  · An upper respiratory infection (URI) is a common infection of the nose, throat, and upper air passages that lead to the lungs.  · A URI is caused by a virus.  · URIs usually get better on their own within 7-10 days.  · Medicines cannot cure URIs, but your health care provider may recommend certain medicines to help relieve symptoms.  This information is not intended to replace advice given to you by your health care provider. Make sure you discuss any questions you have with your health care provider.  Document Released: 06/13/2002 Document Revised: 08/03/2018 Document Reviewed: 08/03/2018  [employer] Interactive Patient Education © 2019 [employer]

## 2019-08-01 RX ORDER — OMEPRAZOLE 20 MG/1
20 CAPSULE, DELAYED RELEASE ORAL DAILY
Qty: 90 CAPSULE | Refills: 0 | Status: SHIPPED | OUTPATIENT
Start: 2019-08-01 | End: 2019-10-22 | Stop reason: SDUPTHER

## 2019-08-13 DIAGNOSIS — F41.1 GAD (GENERALIZED ANXIETY DISORDER): ICD-10-CM

## 2019-08-13 RX ORDER — BUPROPION HYDROCHLORIDE 150 MG/1
150 TABLET ORAL EVERY MORNING
Qty: 30 TABLET | Refills: 0 | Status: SHIPPED | OUTPATIENT
Start: 2019-08-13 | End: 2019-09-12 | Stop reason: SDUPTHER

## 2019-09-12 DIAGNOSIS — F41.1 GAD (GENERALIZED ANXIETY DISORDER): ICD-10-CM

## 2019-09-13 RX ORDER — BUPROPION HYDROCHLORIDE 150 MG/1
TABLET ORAL
Qty: 30 TABLET | Refills: 0 | Status: SHIPPED | OUTPATIENT
Start: 2019-09-13 | End: 2019-10-18 | Stop reason: SDUPTHER

## 2019-10-08 ENCOUNTER — TELEPHONE (OUTPATIENT)
Dept: INTERNAL MEDICINE | Facility: CLINIC | Age: 34
End: 2019-10-08

## 2019-10-08 NOTE — TELEPHONE ENCOUNTER
We received a fax from [facility] requesting a refill for patients Buproprion 150 mg QD. Patient was supposed to have a f/u appt in May, for which he was a no show.  I called LM for him that he will need to schedule an appt before this medication will be refilled.    JORDAN

## 2019-10-14 DIAGNOSIS — F41.1 GAD (GENERALIZED ANXIETY DISORDER): ICD-10-CM

## 2019-10-14 RX ORDER — BUPROPION HYDROCHLORIDE 150 MG/1
150 TABLET ORAL EVERY MORNING
Qty: 30 TABLET | Refills: 0 | OUTPATIENT
Start: 2019-10-14

## 2019-10-18 ENCOUNTER — OFFICE VISIT (OUTPATIENT)
Dept: INTERNAL MEDICINE | Facility: CLINIC | Age: 34
End: 2019-10-18

## 2019-10-18 VITALS
WEIGHT: 194 LBS | SYSTOLIC BLOOD PRESSURE: 130 MMHG | TEMPERATURE: 97.8 F | OXYGEN SATURATION: 98 % | DIASTOLIC BLOOD PRESSURE: 86 MMHG | HEART RATE: 78 BPM | RESPIRATION RATE: 16 BRPM | HEIGHT: 65 IN | BODY MASS INDEX: 32.32 KG/M2

## 2019-10-18 DIAGNOSIS — R07.81 RIB PAIN: ICD-10-CM

## 2019-10-18 DIAGNOSIS — F41.1 GAD (GENERALIZED ANXIETY DISORDER): ICD-10-CM

## 2019-10-18 DIAGNOSIS — K21.9 GASTROESOPHAGEAL REFLUX DISEASE, ESOPHAGITIS PRESENCE NOT SPECIFIED: Primary | ICD-10-CM

## 2019-10-18 PROCEDURE — 99214 OFFICE O/P EST MOD 30 MIN: CPT | Performed by: FAMILY MEDICINE

## 2019-10-18 RX ORDER — CLOBETASOL PROPIONATE 0.5 MG/G
OINTMENT TOPICAL AS NEEDED
Refills: 2 | COMMUNITY
Start: 2019-08-01

## 2019-10-18 RX ORDER — FLUOCINONIDE TOPICAL SOLUTION USP, 0.05% 0.5 MG/ML
SOLUTION TOPICAL
Refills: 2 | COMMUNITY
Start: 2019-08-01 | End: 2021-06-23

## 2019-10-18 RX ORDER — BUPROPION HYDROCHLORIDE 150 MG/1
150 TABLET ORAL EVERY MORNING
Qty: 90 TABLET | Refills: 1 | Status: SHIPPED | OUTPATIENT
Start: 2019-10-18 | End: 2020-04-17

## 2019-10-18 NOTE — PROGRESS NOTES
"CC:   Chief Complaint   Patient presents with   • Abdominal Pain   • Med Refill     all meds       History:  Juan Gonzalez is a 34 y.o. male who presents today for follow-up for evaluation of the above:    Needs refill of Wellbutrin which is controlling anxiety    Still taking omeprazole daily    Sleep stable on trazodone    2 weeks of anterior rib pain located at the lower costal margin worse with twisting and walking.  Does have prolonged sitting as part of his job.    ROS:  Review of Systems   Constitutional: Negative for fatigue. Activity change: more sitting, driving.   Gastrointestinal: Positive for abdominal pain.   Psychiatric/Behavioral: Negative for behavioral problems.       Mr. Gonzalez  reports that he has been smoking cigarettes.  He has a 1.25 pack-year smoking history. He has never used smokeless tobacco. He reports that he drinks alcohol. He reports that he does not use drugs.      Current Outpatient Medications:   •  buPROPion XL (WELLBUTRIN XL) 150 MG 24 hr tablet, Take 1 tablet by mouth Every Morning., Disp: 90 tablet, Rfl: 1  •  clobetasol (TEMOVATE) 0.05 % ointment, APPLY TO AFFECTED AREA TWICE A DAY, Disp: , Rfl: 2  •  colesevelam (WELCHOL) 625 MG tablet, TAKE 3 TABLETS BY MOUTH 2 TIMES DAILY (WITH MEALS), Disp: , Rfl: 2  •  fluocinonide (LIDEX) 0.05 % external solution, APPLY ON THE SKIN TWICE A DAY, Disp: , Rfl: 2  •  omeprazole (priLOSEC) 20 MG capsule, Take 1 capsule by mouth Daily., Disp: 90 capsule, Rfl: 0  •  traZODone (DESYREL) 100 MG tablet, Take 100 mg by mouth Every Night., Disp: , Rfl: 3  •  brompheniramine-pseudoephedrine-DM 30-2-10 MG/5ML syrup, Take 5 mL by mouth At Night As Needed for Congestion or Cough., Disp: 118 mL, Rfl: 0      OBJECTIVE:  /86 (BP Location: Right arm, Patient Position: Sitting, Cuff Size: Adult)   Pulse 78   Temp 97.8 °F (36.6 °C) (Oral)   Resp 16   Ht 165.1 cm (65\")   Wt 88 kg (194 lb)   SpO2 98%   BMI 32.28 kg/m²    Physical Exam "   Constitutional: He is oriented to person, place, and time. No distress.   HENT:   Head: Normocephalic and atraumatic.   Nose: Nose normal.   Eyes: Conjunctivae are normal. Right eye exhibits no discharge. Left eye exhibits no discharge. No scleral icterus.   Neck: No tracheal deviation present.   Pulmonary/Chest: Effort normal. He exhibits no tenderness.   Neurological: He is alert and oriented to person, place, and time.   Skin: Skin is warm and dry. He is not diaphoretic. No pallor.   Psychiatric: He has a normal mood and affect. His behavior is normal. Judgment and thought content normal.   Nursing note and vitals reviewed.      Assessment/Plan     Diagnosis Plan   1. Gastroesophageal reflux disease, esophagitis presence not specified     2. ANASTACIO (generalized anxiety disorder)  buPROPion XL (WELLBUTRIN XL) 150 MG 24 hr tablet   3. Rib pain     -Continue omeprazole  -Continue Wellbutrin and trazodone  -Recommend stretching and core work for rib pain    An After Visit Summary was printed and given to the patient at discharge.  Return in about 6 months (around 4/18/2020). Sooner if problems arise.         Yo Martinez D.O.  Family Medicine  Osteopathic Neuromusculoskeletal Medicine

## 2019-10-22 RX ORDER — OMEPRAZOLE 20 MG/1
20 CAPSULE, DELAYED RELEASE ORAL DAILY
Qty: 90 CAPSULE | Refills: 1 | Status: SHIPPED | OUTPATIENT
Start: 2019-10-22 | End: 2020-04-17

## 2020-02-19 ENCOUNTER — PATIENT MESSAGE (OUTPATIENT)
Dept: INTERNAL MEDICINE | Facility: CLINIC | Age: 35
End: 2020-02-19

## 2020-02-19 DIAGNOSIS — Z30.2 ENCOUNTER FOR VASECTOMY: Primary | ICD-10-CM

## 2020-02-20 NOTE — TELEPHONE ENCOUNTER
From: Juan Gonzalez  To: Yo Martinez,   Sent: 2/19/2020 1:21 PM CST  Subject: Non-Urgent Medical Question    Hello,    Last year I got a referral to a urologist to get a vasectomy. I never went to the urologist and I can't remember who it was. Do I need a new referral?    -Juan

## 2020-02-27 ENCOUNTER — PATIENT MESSAGE (OUTPATIENT)
Dept: INTERNAL MEDICINE | Facility: CLINIC | Age: 35
End: 2020-02-27

## 2020-03-03 NOTE — TELEPHONE ENCOUNTER
From: Juan Gonzalez  To: Yo Martinez DO  Sent: 2/27/2020 10:44 AM CST  Subject: Non-Urgent Medical Question    Thanks. Was I supposed to reach out to them? I never heard anything.  ----- Message -----  From: Yo Martinez DO  Sent: 2/20/2020 10:01 AM EST  To: Juan Gonzalez  Subject: RE: Non-Urgent Medical Question  I made a new referral today.     ----- Message -----   From: Juan Gonzalez   Sent: 2/19/2020 1:21 PM CST   To: Yo Martinez DO  Subject: Non-Urgent Medical Question    Hello,    Last year I got a referral to a urologist to get a vasectomy. I never went to the urologist and I can't remember who it was. Do I need a new referral?    -Juan

## 2020-03-14 ENCOUNTER — OFFICE VISIT (OUTPATIENT)
Dept: INTERNAL MEDICINE | Facility: CLINIC | Age: 35
End: 2020-03-14

## 2020-03-14 VITALS
DIASTOLIC BLOOD PRESSURE: 75 MMHG | HEIGHT: 65 IN | HEART RATE: 93 BPM | TEMPERATURE: 97.8 F | SYSTOLIC BLOOD PRESSURE: 132 MMHG | WEIGHT: 195 LBS | OXYGEN SATURATION: 98 % | BODY MASS INDEX: 32.49 KG/M2

## 2020-03-14 DIAGNOSIS — Z20.828 EXPOSURE TO INFLUENZA: Primary | ICD-10-CM

## 2020-03-14 PROCEDURE — 99213 OFFICE O/P EST LOW 20 MIN: CPT | Performed by: FAMILY MEDICINE

## 2020-03-14 RX ORDER — OSELTAMIVIR PHOSPHATE 75 MG/1
75 CAPSULE ORAL DAILY
Qty: 10 CAPSULE | Refills: 0 | Status: SHIPPED | OUTPATIENT
Start: 2020-03-14 | End: 2020-03-24

## 2020-03-14 NOTE — PROGRESS NOTES
Subjective     Chief Complaint   Patient presents with   • Establish Care     BEEN EXPOSED TO FLU       History of Present Illness  Patient with history of flu exposure.  Currently no symptoms.  No nausea vomiting no fever no cough.  Does have a an 18-month old toddler at home.  Patient's PMR from outside medical facility reviewed and noted.    Review of Systems     Otherwise complete ROS reviewed and negative except as mentioned in the HPI.    Past Medical History:   Past Medical History:   Diagnosis Date   • Acid reflux    • Allergic     improved      Past Surgical History:  Past Surgical History:   Procedure Laterality Date   • CHOLECYSTECTOMY       Social History:  reports that he has been smoking cigarettes. He has a 1.25 pack-year smoking history. He has never used smokeless tobacco. He reports that he drinks alcohol. He reports that he does not use drugs.    Family History: family history includes Aneurysm in his maternal grandmother and paternal grandmother; Dementia in his paternal grandmother; Heart disease in his paternal grandfather.       Allergies:  No Known Allergies  Medications:  Prior to Admission medications    Medication Sig Start Date End Date Taking? Authorizing Provider   buPROPion XL (WELLBUTRIN XL) 150 MG 24 hr tablet Take 1 tablet by mouth Every Morning. 10/18/19  Yes Yo Martinez DO   colesevelam (WELCHOL) 625 MG tablet TAKE 3 TABLETS BY MOUTH 2 TIMES DAILY (WITH MEALS) 9/18/18  Yes ProviderAnderson MD   fluocinonide (LIDEX) 0.05 % external solution APPLY ON THE SKIN TWICE A DAY 8/1/19  Yes Anderson Rizo MD   omeprazole (priLOSEC) 20 MG capsule Take 1 capsule by mouth Daily. 10/22/19  Yes Yo Martinez DO   brompheniramine-pseudoephedrine-DM 30-2-10 MG/5ML syrup Take 5 mL by mouth At Night As Needed for Congestion or Cough. 5/23/19   Milly Bang APRN   clobetasol (TEMOVATE) 0.05 % ointment APPLY TO AFFECTED AREA TWICE A DAY 8/1/19   Provider,  "MD Anderson   traZODone (DESYREL) 100 MG tablet Take 100 mg by mouth Every Night. 9/1/18   Provider, MD Anderson       Objective     Vital Signs: /75 (BP Location: Left arm, Patient Position: Sitting, Cuff Size: Adult)   Pulse 93   Temp 97.8 °F (36.6 °C) (Temporal)   Ht 165.1 cm (65\")   Wt 88.5 kg (195 lb)   SpO2 98%   BMI 32.45 kg/m²   Physical Exam   Constitutional: He is oriented to person, place, and time. He appears well-developed and well-nourished.   HENT:   Head: Normocephalic and atraumatic.   Right Ear: External ear normal.   Left Ear: External ear normal.   Nose: Nose normal.   Mouth/Throat: Oropharynx is clear and moist. No oropharyngeal exudate.   Eyes: Pupils are equal, round, and reactive to light. Conjunctivae and EOM are normal.   Neck: Normal range of motion. Neck supple. No JVD present.   Cardiovascular: Normal rate and regular rhythm. Exam reveals no gallop and no friction rub.   No murmur heard.  Pulmonary/Chest: Effort normal and breath sounds normal.   Abdominal: Soft. Bowel sounds are normal.   Musculoskeletal: Normal range of motion.   Lymphadenopathy:     He has no cervical adenopathy.   Neurological: He is alert and oriented to person, place, and time.   Skin: Skin is warm and dry.   Psychiatric: He has a normal mood and affect. His behavior is normal.   Nursing note and vitals reviewed.          Results Reviewed:  No results found for: GLUCOSE, BUN, CREATININE, NA, K, CL, CO2, CALCIUM, ALT, AST, WBC, HCT, PLT, CHOL, TRIG, HDL, LDL, LDLHDL, HGBA1C      Assessment / Plan     Assessment/Plan:  1. Exposure to influenza  Tamiflu 75 mg daily for 10 days.  Monitor for signs and symptoms of influenza        Return if symptoms worsen or fail to improve. unless patient needs to be seen sooner or acute issues arise.        I have discussed the patient results/orders and and plan/recommendation with them at today's visit.      Joyce Marie DO   03/14/2020        "

## 2020-03-16 NOTE — PROGRESS NOTES
Subjective    Mr. Gonzalez is 34 y.o. male    Chief Complaint: Vasectomy Consult    History of Present Illness     The patient has been pondering the option of a vasectomy for 6 months. Anatomically this is a lower genital tract issue/procedure. With regard to context of the decision, he presently has 2 children. He is . Associated/Relevant symptoms/signs include None. He voices no additional questions about birth control options.     The following portions of the patient's history were reviewed and updated as appropriate: allergies, current medications, past family history, past medical history, past social history, past surgical history and problem list.    Review of Systems   Constitutional: Negative for appetite change and fever.   HENT: Negative for hearing loss and sore throat.    Eyes: Negative for pain and redness.   Respiratory: Negative for cough and shortness of breath.    Cardiovascular: Negative for chest pain and leg swelling.   Gastrointestinal: Negative for anal bleeding, nausea and vomiting.   Endocrine: Negative for cold intolerance and heat intolerance.   Genitourinary: Negative for dysuria, flank pain, hematuria, penile pain, penile swelling, scrotal swelling and testicular pain.   Musculoskeletal: Negative for joint swelling and myalgias.   Skin: Negative for color change and rash.   Allergic/Immunologic: Negative for immunocompromised state.   Neurological: Negative for dizziness and speech difficulty.   Hematological: Negative for adenopathy. Does not bruise/bleed easily.   Psychiatric/Behavioral: Negative for dysphoric mood and suicidal ideas.         Current Outpatient Medications:   •  brompheniramine-pseudoephedrine-DM 30-2-10 MG/5ML syrup, Take 5 mL by mouth At Night As Needed for Congestion or Cough., Disp: 118 mL, Rfl: 0  •  buPROPion XL (WELLBUTRIN XL) 150 MG 24 hr tablet, Take 1 tablet by mouth Every Morning., Disp: 90 tablet, Rfl: 1  •  clobetasol (TEMOVATE) 0.05 % ointment,  "APPLY TO AFFECTED AREA TWICE A DAY, Disp: , Rfl: 2  •  colesevelam (WELCHOL) 625 MG tablet, TAKE 3 TABLETS BY MOUTH 2 TIMES DAILY (WITH MEALS), Disp: , Rfl: 2  •  fluocinonide (LIDEX) 0.05 % external solution, APPLY ON THE SKIN TWICE A DAY, Disp: , Rfl: 2  •  omeprazole (priLOSEC) 20 MG capsule, Take 1 capsule by mouth Daily., Disp: 90 capsule, Rfl: 1  •  oseltamivir (TAMIFLU) 75 MG capsule, Take 1 capsule by mouth Daily for 10 days., Disp: 10 capsule, Rfl: 0  •  traZODone (DESYREL) 100 MG tablet, Take 100 mg by mouth Every Night., Disp: , Rfl: 3    Past Medical History:   Diagnosis Date   • Acid reflux    • Allergic     improved        Past Surgical History:   Procedure Laterality Date   • CHOLECYSTECTOMY         Social History     Socioeconomic History   • Marital status:      Spouse name: Not on file   • Number of children: Not on file   • Years of education: Not on file   • Highest education level: Not on file   Tobacco Use   • Smoking status: Former Smoker     Packs/day: 0.25     Years: 5.00     Pack years: 1.25     Types: Cigarettes   • Smokeless tobacco: Never Used   Substance and Sexual Activity   • Alcohol use: Yes     Frequency: Never     Binge frequency: Daily or almost daily     Comment: < 3 times weekly   • Drug use: No   • Sexual activity: Yes     Partners: Female     Birth control/protection: Condom       Family History   Problem Relation Age of Onset   • Aneurysm Maternal Grandmother    • Aneurysm Paternal Grandmother    • Dementia Paternal Grandmother    • Heart disease Paternal Grandfather        Objective    Temp 98.8 °F (37.1 °C)   Ht 165.1 cm (65\")   Wt 90.9 kg (200 lb 6.4 oz)   BMI 33.35 kg/m²     Physical Exam   Constitutional: He is oriented to person, place, and time. He appears well-developed and well-nourished. No distress.   HENT:   Nose: Nose normal.   Neck: Normal range of motion. Neck supple. No tracheal deviation present. No thyromegaly present.   Cardiovascular: Normal " rate, regular rhythm and intact distal pulses.   No significant edema or tenderness    Pulmonary/Chest: Breath sounds normal. No accessory muscle usage. No respiratory distress.   Abdominal: Soft. Bowel sounds are normal. He exhibits no distension, no ascites and no mass. There is no hepatosplenomegaly. There is no tenderness. There is no rebound, no guarding and no CVA tenderness. No hernia.   Stool specimen is not indicated for my portion of the exam   Genitourinary: Testes normal and penis normal. Rectal exam shows no mass, no tenderness, anal tone normal and guaiac negative stool. Tender: no nodules. Right testis shows no mass, no swelling and no tenderness. Left testis shows no mass, no swelling and no tenderness. No penile tenderness (no lesion or deformities).   Genitourinary Comments:  The urethral meatus normal in position without evidence of stricture. Epididymis without mass or tenderness. Vas Deferens is palpably normal.     Lymphadenopathy:     He has no cervical adenopathy. No inguinal adenopathy noted on the right or left side.        Right: No inguinal adenopathy present.        Left: No inguinal adenopathy present.   Neurological: He is alert and oriented to person, place, and time.   Skin: Skin is warm and dry. No rash noted. He is not diaphoretic. No pallor.   Psychiatric: He has a normal mood and affect. His behavior is normal.   Vitals reviewed.          Results for orders placed or performed in visit on 02/27/19   POCT Influenza A/B   Result Value Ref Range    Rapid Influenza A Ag Negative Negative    Rapid Influenza B Ag Negative Negative    Internal Control Passed Passed    Lot Number [id number]     Expiration Date 12,312,020    POCT rapid strep A   Result Value Ref Range    Rapid Strep A Screen Negative Negative, VALID, INVALID, Not Performed    Internal Control Passed Passed    Lot Number [id number]     Expiration Date 8,312,020      Assessment and Plan    Diagnoses and all orders for this  visit:    Encounter for vasectomy        He was given the consent form, pre-vasectomy instruction sheet, and vasectomy booklet. I extensively reviewed with him the likely postoperative recuperative period as well as the need to continue to use contraception until he is notified by us of his sterility. He will have a semen analysis after 20-30 ejaculations. He understands the potential side effects of local anesthesia, bleeding, scrotal hematoma, wound infection, epididymal orchitis, epididymal congestion,  1% risk chronic testicular pain potentially requiring further surgery, sperm granuloma, antisperm antibodies, early recanalization, spontaneous recanalization with pregnancy after demonstration of azoospermia risk of 1 in 2000 and the possible association with prostate cancer. He is aware of alternatives to vasectomy. He has given this careful consideration and wishes to proceed with a vasectomy.

## 2020-03-18 ENCOUNTER — OFFICE VISIT (OUTPATIENT)
Dept: UROLOGY | Facility: CLINIC | Age: 35
End: 2020-03-18

## 2020-03-18 VITALS — WEIGHT: 200.4 LBS | BODY MASS INDEX: 33.39 KG/M2 | TEMPERATURE: 98.8 F | HEIGHT: 65 IN

## 2020-03-18 DIAGNOSIS — Z30.2 ENCOUNTER FOR VASECTOMY: Primary | ICD-10-CM

## 2020-03-18 PROCEDURE — 99203 OFFICE O/P NEW LOW 30 MIN: CPT | Performed by: UROLOGY

## 2020-03-18 RX ORDER — HYDROCODONE BITARTRATE AND ACETAMINOPHEN 7.5; 325 MG/1; MG/1
1 TABLET ORAL EVERY 6 HOURS PRN
Qty: 12 TABLET | Refills: 0 | Status: SHIPPED | OUTPATIENT
Start: 2020-03-18 | End: 2021-06-23

## 2020-03-18 RX ORDER — DIAZEPAM 10 MG/1
10 TABLET ORAL 2 TIMES DAILY PRN
Qty: 1 TABLET | Refills: 0 | Status: SHIPPED | OUTPATIENT
Start: 2020-03-18 | End: 2021-06-23

## 2020-04-16 ENCOUNTER — TELEPHONE (OUTPATIENT)
Dept: UROLOGY | Facility: CLINIC | Age: 35
End: 2020-04-16

## 2020-04-17 DIAGNOSIS — F41.1 GAD (GENERALIZED ANXIETY DISORDER): ICD-10-CM

## 2020-04-17 RX ORDER — OMEPRAZOLE 20 MG/1
20 CAPSULE, DELAYED RELEASE ORAL DAILY
Qty: 90 CAPSULE | Refills: 1 | Status: SHIPPED | OUTPATIENT
Start: 2020-04-17 | End: 2020-05-01

## 2020-04-17 RX ORDER — BUPROPION HYDROCHLORIDE 150 MG/1
150 TABLET ORAL EVERY MORNING
Qty: 90 TABLET | Refills: 1 | Status: SHIPPED | OUTPATIENT
Start: 2020-04-17 | End: 2020-10-20

## 2020-05-01 ENCOUNTER — TELEMEDICINE (OUTPATIENT)
Dept: INTERNAL MEDICINE | Facility: CLINIC | Age: 35
End: 2020-05-01

## 2020-05-01 DIAGNOSIS — K21.9 GASTROESOPHAGEAL REFLUX DISEASE, ESOPHAGITIS PRESENCE NOT SPECIFIED: ICD-10-CM

## 2020-05-01 DIAGNOSIS — L29.9 ITCHY SCALP: ICD-10-CM

## 2020-05-01 DIAGNOSIS — F41.1 GAD (GENERALIZED ANXIETY DISORDER): Primary | ICD-10-CM

## 2020-05-01 PROCEDURE — 99214 OFFICE O/P EST MOD 30 MIN: CPT | Performed by: FAMILY MEDICINE

## 2020-05-01 NOTE — PROGRESS NOTES
CC:   Chief Complaint   Patient presents with   • Follow-up       History:  Juan Gonzalez is a 34 y.o. male who presents today for follow-up for evaluation of the above:    Seen via video visit  The use of a video visit has been reviewed with the patient and verbal informed consent has been obtained.    Mood well controlled on Wellbutrin  GERD resolved, no longer taking omeprazole  Has meds ordered for upcoming vasectomy, mild lightheadedness  Using clobetasol    ROS:  Review of Systems   Constitutional: Negative.    Gastrointestinal: Negative.    Psychiatric/Behavioral: Negative.        Mr. Gonzalez  reports that he has quit smoking. His smoking use included cigarettes. He has a 1.25 pack-year smoking history. He has never used smokeless tobacco. He reports that he drinks alcohol. He reports that he does not use drugs.      Current Outpatient Medications:   •  buPROPion XL (WELLBUTRIN XL) 150 MG 24 hr tablet, Take 1 tablet by mouth Every Morning., Disp: 90 tablet, Rfl: 1  •  clobetasol (TEMOVATE) 0.05 % ointment, APPLY TO AFFECTED AREA TWICE A DAY, Disp: , Rfl: 2  •  colesevelam (WELCHOL) 625 MG tablet, TAKE 3 TABLETS BY MOUTH 2 TIMES DAILY (WITH MEALS), Disp: , Rfl: 2  •  fluocinonide (LIDEX) 0.05 % external solution, APPLY ON THE SKIN TWICE A DAY, Disp: , Rfl: 2  •  traZODone (DESYREL) 100 MG tablet, Take 100 mg by mouth Every Night., Disp: , Rfl: 3  •  diazePAM (VALIUM) 10 MG tablet, Take 1 tablet by mouth 2 (Two) Times a Day As Needed for Anxiety. Take prior to procedure, Disp: 1 tablet, Rfl: 0  •  HYDROcodone-acetaminophen (NORCO) 7.5-325 MG per tablet, Take 1 tablet by mouth Every 6 (Six) Hours As Needed for Moderate Pain ., Disp: 12 tablet, Rfl: 0      OBJECTIVE:  There were no vitals taken for this visit.   Physical Exam   Constitutional: He is oriented to person, place, and time. No distress.   HENT:   Head: Normocephalic and atraumatic.   Nose: Nose normal.   Eyes: Conjunctivae are normal. Right eye exhibits  no discharge. Left eye exhibits no discharge. No scleral icterus.   Pulmonary/Chest: Effort normal.   Neurological: He is alert and oriented to person, place, and time.   Skin: He is not diaphoretic. No pallor.   Psychiatric: He has a normal mood and affect. His behavior is normal. Judgment and thought content normal.       Assessment/Plan     Diagnosis Plan   1. ANASTACIO (generalized anxiety disorder)     2. Gastroesophageal reflux disease, esophagitis presence not specified     3. Itchy scalp     Continue Wellbutrin and clobetasol  GERD resolved    An After Visit Summary was printed and given to the patient at discharge.  Return in about 6 months (around 11/1/2020). Sooner if problems arise.         Yo Martinez D.O.  Family [facility]  Osteopathic Neuromusculoskeletal Medicine

## 2020-05-11 ENCOUNTER — TELEPHONE (OUTPATIENT)
Dept: UROLOGY | Facility: CLINIC | Age: 35
End: 2020-05-11

## 2020-05-11 NOTE — TELEPHONE ENCOUNTER
----- Message from Juan Gonzalez sent at 5/8/2020  6:22 AM CDT -----  Regarding: Non-Urgent Medical Question  Contact: [phone]  I have a vasectomy scheduled for May 22, is there anyway I can reschedule that for a time after that? I didn't realize that was the holiday weekend. Can I reschedule for a a couple weeks after that? Let me know. Thanks

## 2020-05-11 NOTE — TELEPHONE ENCOUNTER
I canceled his Vas appt for May 22nd and called to reschedule him for June 26th. I am waiting for a phone call back, pt did not answer, left V/M

## 2020-07-05 PROCEDURE — U0003 INFECTIOUS AGENT DETECTION BY NUCLEIC ACID (DNA OR RNA); SEVERE ACUTE RESPIRATORY SYNDROME CORONAVIRUS 2 (SARS-COV-2) (CORONAVIRUS DISEASE [COVID-19]), AMPLIFIED PROBE TECHNIQUE, MAKING USE OF HIGH THROUGHPUT TECHNOLOGIES AS DESCRIBED BY CMS-2020-01-R: HCPCS | Performed by: NURSE PRACTITIONER

## 2020-07-08 ENCOUNTER — TELEPHONE (OUTPATIENT)
Dept: URGENT CARE | Facility: CLINIC | Age: 35
End: 2020-07-08

## 2020-07-08 NOTE — TELEPHONE ENCOUNTER
Patient notified of test results.  Patient is doing better.    COVID-19 Test Result   Telephone Encounter    Patient Name: Juan Gonzalez   : 1985   MRN: [id number]     SARS-CoV-2, ELVIS   Date Value Ref Range Status   2020 Not Detected Not Detected Final     Comment:     This test was developed and its performance characteristics determined  by [facility]. This test has not been FDA cleared or  approved. This test has been authorized by FDA under an Emergency Use  Authorization (EUA). This test is only authorized for the duration of  time the declaration that circumstances exist justifying the  authorization of the emergency use of in vitro diagnostic tests for  detection of SARS-CoV-2 virus and/or diagnosis of COVID-19 infection  under section 564(b)(1) of the Act, 21 U.S.C. 360bbb-3(b)(1), unless  the authorization is terminated or revoked sooner.  When diagnostic testing is negative, the possibility of a false  negative result should be considered in the context of a patient's  recent exposures and the presence of clinical signs and symptoms  consistent with COVID-19. An individual without symptoms of COVID-19  and who is not shedding SARS-CoV-2 virus would expect to have a  negative (not detected) result in this assay.        Patient was counseled as follows:  • (-) negative COVID-19 test result with or without symptoms   • The test is not perfect, so there is a chance it could be falsely negative or the virus level is too low for detection due to being very early in the infectious process.   • The optimal duration of home isolation is uncertain. The [place] Centers for Disease Control and Prevention (CDC) has issued recommendations on discontinuation of home isolation.   • For this reason, Juan is strongly encouraged to practice the safest standards in protecting their health and others given the current pandemic concerns. He is advised to:   o Practice social distancing in the  community by staying at least 6 feet away from people   o Encouraged to use face mask while out in public   o Continue to wash their hands frequently with soap and hot water, and cover their mouth while coughing.   • If Juan is asymptomatic, he should self isolate for a total of 14 days from time of potential contact with Covid-19.   • If Juan is symptomatic then he may discontinue home isolation when the following criteria are met:   o At least seven days have passed since symptoms first appeared AND   o At least three days (72 hours) have passed since recovery of symptoms (defined as resolution of fever without the use of fever-reducing medications and improvement in respiratory symptoms [e.g., cough, shortness of breath])   • If Juan has been asymptomatic but then develops non-emergent symptoms such as mild increased shortness of breath, fever, cough, or for other questions, he  was asked to please call their primary care physician’s office or the [place] hotline at [phone].   · Questions were engaged and answered to the best of my ability. He         expressed verbal understanding of their test results and my advice.    Primary Care Physician verified as being: Yo Martinez DO      Electronically signed by DANA Briceno, 07/08/20, 10:15 AM.

## 2020-07-24 ENCOUNTER — OFFICE VISIT (OUTPATIENT)
Dept: UROLOGY | Facility: CLINIC | Age: 35
End: 2020-07-24

## 2020-07-24 DIAGNOSIS — Z30.2 ENCOUNTER FOR VASECTOMY: Primary | ICD-10-CM

## 2020-07-24 PROCEDURE — 55250 REMOVAL OF SPERM DUCT(S): CPT | Performed by: UROLOGY

## 2020-07-24 NOTE — PROGRESS NOTES
No Scalpel Vasectomy Procedure Note    Indications: 35 y.o. male desiring permanent sterilization    Pre-operative Diagnosis: Undesired fertility    Post-operative Diagnosis: Undesired fertility    Anesthesia: Lidocaine 1% without epinephrine     Procedure Details     The risks and benefits of the procedure were discussed at the pre-procedure consultation, and written, informed consent obtained.    Premedicated with Norco 7.5 and 2mg Xanax 30 minutes prior to procedure.    The scrotum was palpated with both testes normal in size and position, no masses palpated. The scrotum was cleansed with warm Betadine and draped in the usual sterile manner.     A vasal sheath block was performed on both the  right vas.  After adequate anesthesia was established, a small perforation was made in the skin and the right vas was isolated with the ring forceps, dissected free and delivered through the skin perforation.  The right vas was divided, approximately 3 cm portion removed, and each end of the vas was cauterized.  The ends of the vas were replaced in the scrotum through the puncture site.  I attempted to isolate the left vas and I was unable to do so.  After approximately 20 minutes I decided best to terminate the procedure.    Any bleeding was controlled with electrocautery.  3.0 Chormic interrupted suture was used to close both sites. The puncture site was dry when the procedure was completed. Dressing was applied to both incisions and jock strap placed for scrotal support.    Specimen: None    Condition: Stable    Complications: None    Plan:  1.  Renal ultrasound to see if he has an ipsilateral kidney on the left.  He does not have a kidney on the left and he does not have a vas deferens on the left.  If the kidney is present we will proceed with left vasectomy in the operating room.

## 2020-07-28 ENCOUNTER — HOSPITAL ENCOUNTER (OUTPATIENT)
Dept: ULTRASOUND IMAGING | Facility: HOSPITAL | Age: 35
Discharge: HOME | End: 2020-07-28
Admitting: UROLOGY

## 2020-07-28 DIAGNOSIS — Z30.2 ENCOUNTER FOR VASECTOMY: ICD-10-CM

## 2020-07-28 PROCEDURE — 76775 US EXAM ABDO BACK WALL LIM: CPT

## 2020-07-28 NOTE — PROGRESS NOTES
Subjective    Mr. Gonzalez is 35 y.o. male    Chief Complaint:Radiologic Abnormality    History of Present Illness     Abnormal radiographic finding   This patient presents with a possible abnormal finding of the left kidneyon Renal US. This is a  new finding. This test was done 2 day(s) ago. Onset is sudden. This was done in context of evaluation for non palpable vas. Symptoms include No evidence of hematuria, dysuria, or pain.       The following portions of the patient's history were reviewed and updated as appropriate: allergies, current medications, past family history, past medical history, past social history, past surgical history and problem list.    Review of Systems   Constitutional: Negative for chills and fever.   Gastrointestinal: Negative for abdominal pain, anal bleeding and blood in stool.   Genitourinary: Negative for dysuria, frequency, hematuria and urgency.         Current Outpatient Medications:   •  buPROPion XL (WELLBUTRIN XL) 150 MG 24 hr tablet, Take 1 tablet by mouth Every Morning., Disp: 90 tablet, Rfl: 1  •  clobetasol (TEMOVATE) 0.05 % ointment, Apply  topically to the appropriate area as directed As Needed., Disp: , Rfl: 2  •  colesevelam (WELCHOL) 625 MG tablet, Take 1,875 mg by mouth 2 (Two) Times a Day With Meals., Disp: , Rfl: 2  •  fluocinonide (LIDEX) 0.05 % external solution, Every 30 (Thirty) Days., Disp: , Rfl: 2  •  diazePAM (VALIUM) 10 MG tablet, Take 1 tablet by mouth 2 (Two) Times a Day As Needed for Anxiety. Take prior to procedure, Disp: 1 tablet, Rfl: 0  •  HYDROcodone-acetaminophen (NORCO) 7.5-325 MG per tablet, Take 1 tablet by mouth Every 6 (Six) Hours As Needed for Moderate Pain ., Disp: 12 tablet, Rfl: 0    Past Medical History:   Diagnosis Date   • Acid reflux    • Allergic     improved        Past Surgical History:   Procedure Laterality Date   • CHOLECYSTECTOMY         Social History     Socioeconomic History   • Marital status:      Spouse name: Not on  "file   • Number of children: Not on file   • Years of education: Not on file   • Highest education level: Not on file   Tobacco Use   • Smoking status: Former Smoker     Packs/day: 0.25     Years: 5.00     Pack years: 1.25     Types: Cigarettes   • Smokeless tobacco: Never Used   Substance and Sexual Activity   • Alcohol use: Not Currently     Frequency: Never     Binge frequency: Daily or almost daily     Comment: quit two months ago   • Drug use: No   • Sexual activity: Yes     Partners: Female     Birth control/protection: Condom       Family History   Problem Relation Age of Onset   • Aneurysm Maternal Grandmother    • Aneurysm Paternal Grandmother    • Dementia Paternal Grandmother    • Heart disease Paternal Grandfather        Objective    Temp 98.7 °F (37.1 °C) (Temporal)   Ht 165.1 cm (65\")   Wt 92.5 kg (204 lb)   BMI 33.95 kg/m²     Physical Exam   Constitutional: He is oriented to person, place, and time. He appears well-developed and well-nourished. No distress.   Pulmonary/Chest: Effort normal.   Abdominal: Soft. He exhibits no distension and no mass. There is no tenderness. There is no rebound and no guarding. No hernia.   Neurological: He is alert and oriented to person, place, and time.   Skin: Skin is warm and dry. He is not diaphoretic.   Psychiatric: He has a normal mood and affect.   Vitals reviewed.      Renal ultrasound independent review    The renal ultrasound is available for me to review.  Treatment recommendations require an independent review.  This film has been reviewed by the radiologist to determine any non urologic abnormalities that are presents.  However, I very closely inspected the kidneys for size, symmetry, contour, parenchymal thickness, perinephric reaction, presence of calcifications, and intrarenal dilation of the collecting system.       The right kidney appears hypertrophied kidney    The left kidney appears absent    The bladder appears normal on thisultrsaound.  The " bladder appears normal in thickness.  There no masses or stones seen on this exam.           Results for orders placed or performed in visit on 07/31/20   POC Urinalysis Dipstick, Multipro   Result Value Ref Range    Color Yellow Yellow, Straw, Dark Yellow, Mita    Clarity, UA Clear Clear    Glucose, UA Negative Negative, 1000 mg/dL (3+) mg/dL    Bilirubin Negative Negative    Ketones, UA Negative Negative    Specific Gravity  1.010 1.005 - 1.030    Blood, UA Negative Negative    pH, Urine 7.0 5.0 - 8.0    Protein, POC Negative Negative mg/dL    Urobilinogen, UA Normal Normal    Nitrite, UA Negative Negative    Leukocytes Negative Negative     Assessment and Plan    Diagnoses and all orders for this visit:    Absent kidney, congenital  -     POC Urinalysis Dipstick, Multipro      Patient had nonpalpable vas.  Renal ultrasound was done he does not have a kidney on the left side which explains why he does not have a vas deferens on the left side.  He will bring a semen sample in 8 weeks.  Follow-up PRN.

## 2020-07-31 ENCOUNTER — OFFICE VISIT (OUTPATIENT)
Dept: UROLOGY | Facility: CLINIC | Age: 35
End: 2020-07-31

## 2020-07-31 VITALS — TEMPERATURE: 98.7 F | BODY MASS INDEX: 33.99 KG/M2 | WEIGHT: 204 LBS | HEIGHT: 65 IN

## 2020-07-31 DIAGNOSIS — Q60.2 ABSENT KIDNEY, CONGENITAL: Primary | ICD-10-CM

## 2020-07-31 LAB
BILIRUB BLD-MCNC: NEGATIVE MG/DL
CLARITY, POC: CLEAR
COLOR UR: YELLOW
GLUCOSE UR STRIP-MCNC: NEGATIVE MG/DL
KETONES UR QL: NEGATIVE
LEUKOCYTE EST, POC: NEGATIVE
NITRITE UR-MCNC: NEGATIVE MG/ML
PH UR: 7 [PH] (ref 5–8)
PROT UR STRIP-MCNC: NEGATIVE MG/DL
RBC # UR STRIP: NEGATIVE /UL
SP GR UR: 1.01 (ref 1–1.03)
UROBILINOGEN UR QL: NORMAL

## 2020-07-31 PROCEDURE — 81001 URINALYSIS AUTO W/SCOPE: CPT | Performed by: UROLOGY

## 2020-07-31 PROCEDURE — 99024 POSTOP FOLLOW-UP VISIT: CPT | Performed by: UROLOGY

## 2020-10-20 DIAGNOSIS — F41.1 GAD (GENERALIZED ANXIETY DISORDER): ICD-10-CM

## 2020-10-20 RX ORDER — BUPROPION HYDROCHLORIDE 150 MG/1
TABLET ORAL
Qty: 90 TABLET | Refills: 1 | Status: SHIPPED | OUTPATIENT
Start: 2020-10-20 | End: 2021-04-02

## 2020-10-20 RX ORDER — OMEPRAZOLE 20 MG/1
CAPSULE, DELAYED RELEASE ORAL
Qty: 90 CAPSULE | Refills: 1 | Status: SHIPPED | OUTPATIENT
Start: 2020-10-20

## 2020-11-08 ENCOUNTER — PATIENT MESSAGE (OUTPATIENT)
Dept: FAMILY MEDICINE CLINIC | Facility: CLINIC | Age: 35
End: 2020-11-08

## 2020-11-08 DIAGNOSIS — K59.1 FUNCTIONAL DIARRHEA: Primary | ICD-10-CM

## 2020-11-10 RX ORDER — COLESEVELAM 180 1/1
1875 TABLET ORAL 2 TIMES DAILY WITH MEALS
Qty: 120 TABLET | Refills: 11 | Status: SHIPPED | OUTPATIENT
Start: 2020-11-10 | End: 2020-12-01 | Stop reason: SDUPTHER

## 2020-11-10 NOTE — TELEPHONE ENCOUNTER
From: Juan Gonzalez  To: Yo Martinez DO  Sent: 11/8/2020 7:40 AM CST  Subject: Prescription Question    I've been taking colesevelam 625 MG tablet  Commonly known as: WELCHOL for several years. It's used to control my bowel movements (I had sudden urges to BM after my gallbladder surgery)    Can I have this prescription renewed? I am running very low.

## 2020-11-25 ENCOUNTER — TELEPHONE (OUTPATIENT)
Dept: FAMILY MEDICINE CLINIC | Facility: CLINIC | Age: 35
End: 2020-11-25

## 2020-11-25 DIAGNOSIS — K59.1 FUNCTIONAL DIARRHEA: ICD-10-CM

## 2020-12-01 RX ORDER — COLESEVELAM 180 1/1
1875 TABLET ORAL 2 TIMES DAILY WITH MEALS
Qty: 540 TABLET | Refills: 0 | Status: SHIPPED | OUTPATIENT
Start: 2020-12-01 | End: 2021-01-05 | Stop reason: SDUPTHER

## 2020-12-07 ENCOUNTER — OFFICE VISIT (OUTPATIENT)
Dept: INTERNAL MEDICINE | Facility: CLINIC | Age: 35
End: 2020-12-07

## 2020-12-07 VITALS
HEIGHT: 65 IN | DIASTOLIC BLOOD PRESSURE: 78 MMHG | SYSTOLIC BLOOD PRESSURE: 119 MMHG | HEART RATE: 89 BPM | WEIGHT: 208 LBS | OXYGEN SATURATION: 99 % | TEMPERATURE: 97.4 F | RESPIRATION RATE: 18 BRPM | BODY MASS INDEX: 34.66 KG/M2

## 2020-12-07 DIAGNOSIS — H61.21 HEARING LOSS DUE TO CERUMEN IMPACTION, RIGHT: ICD-10-CM

## 2020-12-07 DIAGNOSIS — J02.9 PHARYNGITIS, UNSPECIFIED ETIOLOGY: ICD-10-CM

## 2020-12-07 DIAGNOSIS — H66.001 ACUTE SUPPURATIVE OTITIS MEDIA OF RIGHT EAR WITHOUT SPONTANEOUS RUPTURE OF TYMPANIC MEMBRANE, RECURRENCE NOT SPECIFIED: ICD-10-CM

## 2020-12-07 DIAGNOSIS — J02.9 SORE THROAT: Primary | ICD-10-CM

## 2020-12-07 LAB
EXPIRATION DATE: NORMAL
INTERNAL CONTROL: NORMAL
Lab: NORMAL
S PYO AG THROAT QL: NEGATIVE

## 2020-12-07 PROCEDURE — 99213 OFFICE O/P EST LOW 20 MIN: CPT | Performed by: NURSE PRACTITIONER

## 2020-12-07 PROCEDURE — 87880 STREP A ASSAY W/OPTIC: CPT | Performed by: NURSE PRACTITIONER

## 2020-12-07 RX ORDER — AMOXICILLIN 500 MG/1
1000 CAPSULE ORAL 2 TIMES DAILY
Qty: 40 CAPSULE | Refills: 0 | Status: SHIPPED | OUTPATIENT
Start: 2020-12-07 | End: 2021-06-23

## 2020-12-07 NOTE — PROGRESS NOTES
"        Subjective     Chief Complaint   Patient presents with   • Ear Fullness     Intermittent Pain   • Pain     Throat Pain   • Shoulder Pain     Left Side shoots down to biceps.       History of Present Illness  Patient presents today with a multitude of complaints.  First, complains of right-sided ear fullness.  States he has a history of wax buildup.  He had had multiple irrigations and earwax removals in the past.  Any significant pain in the ear but he is having difficulty hearing.  No fevers or chills.  In addition, patient is complaining of sore throat.  This has been intermittent have any significant congestion and states has been either side and at times both sides.  Onset has been the last couple of weeks.  Does work intermittently outside and states if he gets cold he gets worse.  No cough or shortness of breath.  No loss of taste or smell.    Lastly, is complaining of left bicep scapular pain.  This is been more persistent over the last several days.  He has not tried anything to make this better.  Wife states that she thinks that it was \"stress.\"  He does have full range of motion.  He has had no injuries that he is aware of.    Patient's PMR from outside medical facility reviewed and noted.    Review of Systems   Otherwise complete ROS reviewed and negative     Past Medical History:   Past Medical History:   Diagnosis Date   • Acid reflux    • Allergic     improved      Past Surgical History:  Past Surgical History:   Procedure Laterality Date   • CHOLECYSTECTOMY       Social History:  reports that he has quit smoking. His smoking use included cigarettes. He has a 1.25 pack-year smoking history. He has never used smokeless tobacco. He reports previous alcohol use. He reports that he does not use drugs.    Family History: family history includes Aneurysm in his maternal grandmother and paternal grandmother; Dementia in his paternal grandmother; Heart disease in his paternal grandfather.      " "    Allergies:  No Known Allergies  Medications:  Prior to Admission medications    Medication Sig Start Date End Date Taking? Authorizing Provider   buPROPion XL (WELLBUTRIN XL) 150 MG 24 hr tablet TAKE 1 TABLET BY MOUTH EVERY DAY IN THE MORNING 10/20/20  Yes Yo Martinez DO   clobetasol (TEMOVATE) 0.05 % ointment Apply  topically to the appropriate area as directed As Needed. 8/1/19  Yes Anderson Rioz MD   colesevelam (WELCHOL) 625 MG tablet Take 3 tablets by mouth 2 (Two) Times a Day With Meals for 90 days. 12/1/20 3/1/21 Yes Yo Martinez DO   fluocinonide (LIDEX) 0.05 % external solution Every 30 (Thirty) Days. 8/1/19  Yes ProviderAnderson MD   diazePAM (VALIUM) 10 MG tablet Take 1 tablet by mouth 2 (Two) Times a Day As Needed for Anxiety. Take prior to procedure 3/18/20   Yo An MD   HYDROcodone-acetaminophen (NORCO) 7.5-325 MG per tablet Take 1 tablet by mouth Every 6 (Six) Hours As Needed for Moderate Pain . 3/18/20   Yo An MD   omeprazole (priLOSEC) 20 MG capsule TAKE 1 CAPSULE BY MOUTH EVERY DAY 10/20/20   Yo Martinez DO       Objective     Vital Signs: /78 (BP Location: Left arm, Patient Position: Sitting, Cuff Size: Adult)   Pulse 89   Temp 97.4 °F (36.3 °C) (Skin)   Resp 18   Ht 165.1 cm (65\")   Wt 94.3 kg (208 lb)   SpO2 99%   BMI 34.61 kg/m²   Physical Exam  Vitals signs reviewed.   Constitutional:       Appearance: He is well-developed.   HENT:      Head: Normocephalic and atraumatic.      Right Ear: There is impacted cerumen. Tympanic membrane is erythematous ( post cerumen removal. ).      Left Ear: Tympanic membrane normal.      Mouth/Throat:      Pharynx: Posterior oropharyngeal erythema present.   Eyes:      Pupils: Pupils are equal, round, and reactive to light.   Neck:      Musculoskeletal: Normal range of motion and neck supple.      Vascular: No JVD.   Cardiovascular:      Rate and Rhythm: Normal rate and regular " rhythm.   Pulmonary:      Effort: Pulmonary effort is normal.   Abdominal:      General: Bowel sounds are normal.      Palpations: Abdomen is soft.   Musculoskeletal:         General: No deformity.   Lymphadenopathy:      Cervical: No cervical adenopathy.   Skin:     General: Skin is warm and dry.   Neurological:      Mental Status: He is alert and oriented to person, place, and time.   Psychiatric:         Behavior: Behavior normal.         Thought Content: Thought content normal.         Judgment: Judgment normal.             Results Reviewed:  No results found for: GLUCOSE, BUN, CREATININE, NA, K, CL, CO2, CALCIUM, ALT, AST, WBC, HCT, PLT, CHOL, TRIG, HDL, LDL, LDLHDL, HGBA1C      Assessment / Plan     Assessment/Plan:  Diagnoses and all orders for this visit:    1. Sore throat (Primary)  -     POCT rapid strep A-negative    2. Hearing loss due to cerumen impaction, right    3. Pharyngitis, unspecified etiology  -     amoxicillin (AMOXIL) 500 MG capsule; Take 2 capsules by mouth 2 (Two) Times a Day.  Dispense: 40 capsule; Refill: 0    4. Acute suppurative otitis media of right ear without spontaneous rupture of tympanic membrane, recurrence not specified  -     amoxicillin (AMOXIL) 500 MG capsule; Take 2 capsules by mouth 2 (Two) Times a Day.  Dispense: 40 capsule; Refill: 0    Ear Cerumen Removal    Date/Time: 12/7/2020 11:07 AM  Performed by: Hailee Peña APRN  Authorized by: Hailee Peña APRN     Anesthesia:  Local Anesthetic: none  Location details: right ear  Patient tolerance: patient tolerated the procedure well with no immediate complications  Comments: Large quantity of cerumen removed. Post removal evaluation shows expected canal redness but also TM erythema.   Procedure type: instrumentation and irrigation   Sedation:  Patient sedated: no              No follow-ups on file. unless patient needs to be seen sooner or acute issues arise.    Code Status:    I have discussed the  patient results/orders and and plan/recommendation with them at today's visit.      Hailee Peña, DANA   12/07/2020

## 2020-12-14 ENCOUNTER — CLINICAL SUPPORT (OUTPATIENT)
Dept: INTERNAL MEDICINE | Facility: CLINIC | Age: 35
End: 2020-12-14

## 2020-12-14 DIAGNOSIS — J02.9 SORE THROAT: Primary | ICD-10-CM

## 2020-12-14 NOTE — PROGRESS NOTES
Juan Gonzalez  1985  [id number]    Verified patient's NAME and  before test was performed.     COVID-19 Test Performed:   Oropharyngeal Swab     Location:  [facility]HICLE [place] [facility]      How did the patient tolerate the test?   Well tolerated by patient..    Staff Member Performing Test:  Jackie Lujan    PPE Worn:    mask, gloves, eye protection, face shield and gown          Patient presented for COVID-19 testing as ordered by the provider. Appropriate PPE donned. Patient tolerated well. Patient was given COVID-19 Fact Sheet, How to Quarantine at Home Instructions, and Infection Prevention in the Home handout. Patient advised that results are expected within 2-4 days depending on the time of test.     While waiting for results of test, patient was asked to please call their primary care physician's office or the [place] hotline at [phone] for support of non-emergent symptoms expected with this virus such as increased shortness of breath, fever, cough, or other questions.    He was advised to seek care in the Emergency Department should extreme symptoms arise such as new onset confusion, extreme lethargy, hypoxia, or new hypotension.     Questions were engaged and answered to the best of my ability, patient expressed verbal understanding.

## 2020-12-16 LAB — SARS-COV-2 RNA RESP QL NAA+PROBE: NOT DETECTED

## 2020-12-17 ENCOUNTER — OFFICE VISIT (OUTPATIENT)
Dept: INTERNAL MEDICINE | Facility: CLINIC | Age: 35
End: 2020-12-17

## 2020-12-17 ENCOUNTER — TELEPHONE (OUTPATIENT)
Dept: INTERNAL MEDICINE | Facility: CLINIC | Age: 35
End: 2020-12-17

## 2020-12-17 VITALS
WEIGHT: 202 LBS | HEIGHT: 65 IN | DIASTOLIC BLOOD PRESSURE: 83 MMHG | SYSTOLIC BLOOD PRESSURE: 139 MMHG | HEART RATE: 106 BPM | TEMPERATURE: 97.7 F | BODY MASS INDEX: 33.66 KG/M2 | OXYGEN SATURATION: 98 %

## 2020-12-17 DIAGNOSIS — J02.9 SORE THROAT: Primary | ICD-10-CM

## 2020-12-17 LAB
EXPIRATION DATE: NORMAL
EXPIRATION DATE: NORMAL
HETEROPH AB SER QL LA: NEGATIVE
INTERNAL CONTROL: NORMAL
INTERNAL CONTROL: NORMAL
Lab: NORMAL
Lab: NORMAL
S PYO AG THROAT QL: NEGATIVE

## 2020-12-17 PROCEDURE — 87880 STREP A ASSAY W/OPTIC: CPT | Performed by: NURSE PRACTITIONER

## 2020-12-17 PROCEDURE — 86308 HETEROPHILE ANTIBODY SCREEN: CPT | Performed by: NURSE PRACTITIONER

## 2020-12-17 PROCEDURE — 99213 OFFICE O/P EST LOW 20 MIN: CPT | Performed by: NURSE PRACTITIONER

## 2020-12-17 RX ORDER — GUAIFENESIN 600 MG/1
1200 TABLET, EXTENDED RELEASE ORAL 2 TIMES DAILY
Qty: 28 TABLET | Refills: 0 | Status: SHIPPED | OUTPATIENT
Start: 2020-12-17 | End: 2021-06-23

## 2020-12-17 NOTE — PROGRESS NOTES
Subjective     Chief Complaint   Patient presents with   • Sore Throat       History of Present Illness  Pt comes in today with ongoing sore throat. Has had it for 4 weeks. Had a negative Strep and COVID. Tylenol helps the pain. Has been treated with Amoxicillin. Some deep congestion. No fatigue. No ear pain. No fevers. Concerned me may have throat cancer. No ear pain.      Review of Systems   Otherwise complete ROS reviewed.     Past Medical History:   Past Medical History:   Diagnosis Date   • Acid reflux    • Allergic     improved      Past Surgical History:  Past Surgical History:   Procedure Laterality Date   • CHOLECYSTECTOMY       Social History:  reports that he has quit smoking. His smoking use included cigarettes. He has a 1.25 pack-year smoking history. He has never used smokeless tobacco. He reports previous alcohol use. He reports that he does not use drugs.    Family History: family history includes Aneurysm in his maternal grandmother and paternal grandmother; Dementia in his paternal grandmother; Heart disease in his paternal grandfather.      Allergies:  No Known Allergies  Medications:  Prior to Admission medications    Medication Sig Start Date End Date Taking? Authorizing Provider   buPROPion XL (WELLBUTRIN XL) 150 MG 24 hr tablet TAKE 1 TABLET BY MOUTH EVERY DAY IN THE MORNING 10/20/20  Yes Yo Martinez, DO   clobetasol (TEMOVATE) 0.05 % ointment Apply  topically to the appropriate area as directed As Needed. 8/1/19  Yes Anderson Rizo MD   colesevelam (WELCHOL) 625 MG tablet Take 3 tablets by mouth 2 (Two) Times a Day With Meals for 90 days. 12/1/20 3/1/21 Yes Yo Martinez, DO   fluocinonide (LIDEX) 0.05 % external solution Every 30 (Thirty) Days. 8/1/19  Yes Anderson Rizo MD   omeprazole (priLOSEC) 20 MG capsule TAKE 1 CAPSULE BY MOUTH EVERY DAY 10/20/20  Yes Yo Martinez, DO   amoxicillin (AMOXIL) 500 MG capsule Take 2 capsules by mouth 2 (Two) Times a  "Day. 12/7/20   Mariana Hailee Rothman DANA   diazePAM (VALIUM) 10 MG tablet Take 1 tablet by mouth 2 (Two) Times a Day As Needed for Anxiety. Take prior to procedure 3/18/20   Yo An MD   HYDROcodone-acetaminophen (NORCO) 7.5-325 MG per tablet Take 1 tablet by mouth Every 6 (Six) Hours As Needed for Moderate Pain . 3/18/20   Yo An MD       Objective     Vital Signs: /83 (BP Location: Left arm, Patient Position: Sitting, Cuff Size: Adult)   Pulse 106   Temp 97.7 °F (36.5 °C) (Infrared)   Ht 165.1 cm (65\")   Wt 91.6 kg (202 lb)   SpO2 98%   BMI 33.61 kg/m²   Physical Exam  Vitals signs reviewed.   Constitutional:       Appearance: He is well-developed.   HENT:      Head: Normocephalic and atraumatic.      Right Ear: Tympanic membrane normal.      Left Ear: Tympanic membrane normal.      Mouth/Throat:      Mouth: Mucous membranes are moist.   Eyes:      Pupils: Pupils are equal, round, and reactive to light.   Neck:      Musculoskeletal: Normal range of motion and neck supple.      Vascular: No JVD.   Cardiovascular:      Rate and Rhythm: Regular rhythm. Tachycardia present.   Pulmonary:      Effort: Pulmonary effort is normal.      Breath sounds: Normal breath sounds.   Abdominal:      General: Bowel sounds are normal.      Palpations: Abdomen is soft.   Musculoskeletal:         General: No deformity.   Lymphadenopathy:      Cervical: No cervical adenopathy.   Skin:     General: Skin is warm and dry.   Neurological:      Mental Status: He is alert and oriented to person, place, and time.   Psychiatric:         Behavior: Behavior normal.         Thought Content: Thought content normal.         Judgment: Judgment normal.      Comments: Very anxious today.        Results Reviewed:  No results found for: GLUCOSE, BUN, CREATININE, NA, K, CL, CO2, CALCIUM, ALT, AST, WBC, HCT, PLT, CHOL, TRIG, HDL, LDL, LDLHDL, HGBA1C      Assessment / Plan     Assessment/Plan:  Diagnoses and " all orders for this visit:    1. Sore throat (Primary)  -     POCT Infectious mononucleosis antibody  -     POCT rapid strep A  -     Ambulatory Referral to ENT (Otolaryngology)    Other orders  -     guaiFENesin (Mucinex) 600 MG 12 hr tablet; Take 2 tablets by mouth 2 (Two) Times a Day.  Dispense: 28 tablet; Refill: 0    Strep and mono are negative here today. No lymphadenopathy.   Instructed to use salt water gargles 2-3 times per day.   Humidify his air.   Try Mucinex.   Force Fluids.     No follow-ups on file. unless patient needs to be seen sooner or acute issues arise.    Code Status: Full.     I have discussed the patient results/orders and and plan/recommendation with them at today's visit.      Hailee Peña, APRN   12/17/2020

## 2020-12-17 NOTE — TELEPHONE ENCOUNTER
Called patient and informed him that his Covid test was negative. Pt states understanding of results.

## 2020-12-18 DIAGNOSIS — J02.9 SORE THROAT: Primary | ICD-10-CM

## 2021-01-05 DIAGNOSIS — K59.1 FUNCTIONAL DIARRHEA: ICD-10-CM

## 2021-01-05 RX ORDER — COLESEVELAM 180 1/1
1875 TABLET ORAL 2 TIMES DAILY WITH MEALS
Qty: 540 TABLET | Refills: 0 | Status: SHIPPED | OUTPATIENT
Start: 2021-01-05 | End: 2021-06-28

## 2021-01-07 ENCOUNTER — OFFICE VISIT (OUTPATIENT)
Dept: ENT CLINIC | Age: 36
End: 2021-01-07
Payer: COMMERCIAL

## 2021-01-07 VITALS — WEIGHT: 200 LBS | HEIGHT: 65 IN | BODY MASS INDEX: 33.32 KG/M2

## 2021-01-07 DIAGNOSIS — B37.0 ORAL THRUSH: ICD-10-CM

## 2021-01-07 DIAGNOSIS — K21.9 LARYNGOPHARYNGEAL REFLUX (LPR): Primary | ICD-10-CM

## 2021-01-07 PROCEDURE — 99203 OFFICE O/P NEW LOW 30 MIN: CPT | Performed by: PHYSICIAN ASSISTANT

## 2021-01-07 PROCEDURE — 69210 REMOVE IMPACTED EAR WAX UNI: CPT | Performed by: PHYSICIAN ASSISTANT

## 2021-01-07 PROCEDURE — 31575 DIAGNOSTIC LARYNGOSCOPY: CPT | Performed by: PHYSICIAN ASSISTANT

## 2021-01-07 RX ORDER — METHYLPREDNISOLONE 4 MG/1
TABLET ORAL
COMMUNITY
Start: 2021-01-05

## 2021-01-07 RX ORDER — CLOBETASOL PROPIONATE 0.5 MG/G
OINTMENT TOPICAL
COMMUNITY
Start: 2020-12-28

## 2021-01-07 RX ORDER — BUPROPION HYDROCHLORIDE 150 MG/1
TABLET ORAL
COMMUNITY
Start: 2020-10-20

## 2021-01-07 RX ORDER — GUAIFENESIN 600 MG/1
1200 TABLET, EXTENDED RELEASE ORAL 2 TIMES DAILY
COMMUNITY
Start: 2020-12-17

## 2021-01-07 NOTE — PROGRESS NOTES
[facility] OTOLARYNGOLOGY/ENT  Mary Peters is a pleasant 72-year-old  male that was referred by Crissy Troy in [place] due to problems with a persistent sore throat. He reports that he was initially treated with antibiotic therapy and noticed no relief of symptoms. He reports the onset began about a month ago. He describes a scratchy throat that is worse in the mornings when he awakens. He denies any dysphagia with solids or liquids. Overall he feels like something is stuck in his throat. He also complains of some congestion to the ears bilaterally. Allergies: Patient has no known allergies. Current Outpatient Medications   Medication Sig Dispense Refill    nystatin (MYCOSTATIN) 349958 UNIT/ML suspension Take 5 mLs by mouth 4 times daily for 10 days Retain in mouth as long as possible 200 mL 0    omeprazole (PRILOSEC) 20 MG delayed release capsule Take 1 capsule by mouth 2 times daily (before meals) 60 capsule 2    buPROPion (WELLBUTRIN XL) 150 MG extended release tablet TAKE 1 TABLET BY MOUTH EVERY DAY IN THE MORNING      methylPREDNISolone (MEDROL DOSEPACK) 4 MG tablet Take as directed on package instructions.  guaiFENesin (MUCINEX) 600 MG extended release tablet Take 1,200 mg by mouth 2 times daily      clobetasol (TEMOVATE) 0.05 % ointment APPLY TO AFFECTED AREAS OF THE SKIN TWICE DAILY AS NEEDED      colesevelam (WELCHOL) 625 MG tablet TAKE 3 TABLETS BY MOUTH 2 TIMES DAILY (WITH MEALS) 540 tablet 2    melatonin 3 MG TABS tablet Take 10 mg by mouth nightly as needed      traZODone (DESYREL) 100 MG tablet Take 1 tablet by mouth nightly (Patient not taking: Reported on 1/7/2021) 90 tablet 3     No current facility-administered medications for this visit.         Past Surgical History:   Procedure Laterality Date    CHOLECYSTECTOMY  2009    VASECTOMY         Past Medical History:   Diagnosis Date    Anxiety     GERD (gastroesophageal reflux disease)        Family History Problem Relation Age of Onset    Crohn's Disease Sister     Cancer Maternal Uncle     Heart Disease Paternal Grandfather     Mental Illness Paternal Grandfather        Social History     Tobacco Use    Smoking status: Former Smoker     Packs/day: 0.50     Types: Cigarettes     Start date: 4/13/2012     Quit date: 7/28/2017     Years since quitting: 3.4    Smokeless tobacco: Former User   Substance Use Topics    Alcohol use: Not Currently     Alcohol/week: 12.0 standard drinks     Types: 12 Cans of beer per week           REVIEW OF SYSTEMS:  all other systems reviewed and are negative  Review of Systems   Constitutional: Negative for chills and fever. HENT: Positive for ear pain, hearing loss (muffled), sore throat and trouble swallowing. Negative for congestion, dental problem, ear discharge, facial swelling, postnasal drip, rhinorrhea, sinus pressure, sinus pain, tinnitus and voice change. Eyes: Negative for photophobia and pain. Comments:     PHYSICAL EXAM:    Ht 5' 5\" (1.651 m)   Wt 200 lb (90.7 kg)   BMI 33.28 kg/m²   Body mass index is 33.28 kg/m². General Appearance: well developed  and well nourished  Head/ Face: normocephalic and atraumatic  Vocal Quality: good/ normal  Ears: Right Ear: External: external ears normal Otoscopy Ear Canal: cerumen impaction Otoscopy TM: TM's dull and air/fluid level Left Ear: External: external ears normal Otoscopy Ear Canal: cerumen impaction Otoscopy TM: TM's sluggish and air/fluid level  Hearing: grossly intact  Nose: nares normal and septum midline  Neck: supple and mass No  Thyroid: normal and nodules No    Assessment & Plan:    Problem List Items Addressed This Visit     Oral thrush     Oral thrushnoted on posterior surface of the tongue. Plan: I will place the patient on Mycostatin swish and swallow for 10 days. He is advised that if this worsens he is call the office for further advice.          Laryngopharyngeal reflux (LPR) - Primary  omeprazole (PRILOSEC) 20 MG delayed release capsule     Sig: Take 1 capsule by mouth 2 times daily (before meals)     Dispense:  60 capsule     Refill:  2       Electronically signed by Guevara Michele PA-C on 1/8/21 at 3:09 PM CST          Please note that this chart was generated using dragon dictation software. Although every effort was made to ensure the accuracy of this automated transcription, some errors in transcription may have occurred.

## 2021-01-08 ENCOUNTER — TELEPHONE (OUTPATIENT)
Dept: ENT CLINIC | Age: 36
End: 2021-01-08

## 2021-01-08 PROBLEM — B37.0 ORAL THRUSH: Status: ACTIVE | Noted: 2021-01-08

## 2021-01-08 PROBLEM — K21.9 LARYNGOPHARYNGEAL REFLUX (LPR): Status: ACTIVE | Noted: 2021-01-08

## 2021-01-08 RX ORDER — OMEPRAZOLE 20 MG/1
20 CAPSULE, DELAYED RELEASE ORAL
Qty: 60 CAPSULE | Refills: 2 | Status: SHIPPED | OUTPATIENT
Start: 2021-01-08 | End: 2021-05-06

## 2021-01-08 ASSESSMENT — ENCOUNTER SYMPTOMS
FACIAL SWELLING: 0
SINUS PAIN: 0
VOICE CHANGE: 0
SINUS PRESSURE: 0
PHOTOPHOBIA: 0
SORE THROAT: 1
RHINORRHEA: 0
EYE PAIN: 0
TROUBLE SWALLOWING: 1

## 2021-01-08 NOTE — ASSESSMENT & PLAN NOTE
Laryngopharyngeal reflux diseaseconfirm by laryngoscopy  Plan: I will place the patient on omeprazole 20 mg twice daily for total of 3 months. I advised the patient to follow with me in 1 month for reevaluation. He is reminded to call if he has any questions or problems.

## 2021-01-08 NOTE — ASSESSMENT & PLAN NOTE
Oral thrushnoted on posterior surface of the tongue. Plan: I will place the patient on Mycostatin swish and swallow for 10 days. He is advised that if this worsens he is call the office for further advice.

## 2021-01-18 ENCOUNTER — TRANSCRIBE ORDERS (OUTPATIENT)
Dept: LAB | Facility: HOSPITAL | Age: 36
End: 2021-01-18

## 2021-01-18 DIAGNOSIS — Z01.818 PRE-OP TESTING: Primary | ICD-10-CM

## 2021-01-19 ENCOUNTER — PATIENT MESSAGE (OUTPATIENT)
Dept: ENT CLINIC | Age: 36
End: 2021-01-19

## 2021-01-19 NOTE — TELEPHONE ENCOUNTER
From: Willie Gonzalez  To: Zeinab Carrasco PA-C  Sent: 1/19/2021 6:31 AM CST  Subject: Prescription Question    My throat is still hurting. I've noticed that when I take the nyastatin it will feel better, maybe the infection is still there? I am running out of the nyastatin, should I get another refill and continue the treatment?

## 2021-02-02 RX ORDER — CLOTRIMAZOLE 10 MG/1
10 LOZENGE ORAL; TOPICAL 3 TIMES DAILY
Qty: 30 TROCHE | Refills: 0 | Status: SHIPPED | OUTPATIENT
Start: 2021-02-02 | End: 2021-02-12

## 2021-02-12 ENCOUNTER — OFFICE VISIT (OUTPATIENT)
Dept: ENT CLINIC | Age: 36
End: 2021-02-12
Payer: COMMERCIAL

## 2021-02-12 VITALS
SYSTOLIC BLOOD PRESSURE: 120 MMHG | HEIGHT: 65 IN | WEIGHT: 200 LBS | BODY MASS INDEX: 33.32 KG/M2 | DIASTOLIC BLOOD PRESSURE: 78 MMHG

## 2021-02-12 DIAGNOSIS — H61.23 BILATERAL IMPACTED CERUMEN: Primary | ICD-10-CM

## 2021-02-12 PROCEDURE — 69210 REMOVE IMPACTED EAR WAX UNI: CPT | Performed by: PHYSICIAN ASSISTANT

## 2021-02-12 PROCEDURE — 99214 OFFICE O/P EST MOD 30 MIN: CPT | Performed by: PHYSICIAN ASSISTANT

## 2021-02-12 NOTE — PROGRESS NOTES
Davey Parnell is a pleasant 81-year-old  male that presents for a 1 month follow-up due to oral thrush as well as laryngeal pharyngeal reflux disease. He reports that he has completed 2 rounds of Mycostatin swish and swallow as well as Mycelex. He reports that the tongue surface feels much improved with resolved sore throat. He reports that he still has somewhat of a globus sensation that seems to be worse recently despite taking his medications. After obtaining more history, he admitted to drinking apple cider vinegar to help get rid of the yeast infection. I advised him that this probably is the contributing factor to his breakthrough reflux. Currently he is eating well with no dysphagia or any taste distortion. Physical examination revealed the patient to have a resolved oral thrush to the surface of the tongue confirmed by tongue blade. The posterior pharyngeal wall appeared nonerythematous and clear of any type of yeast.  Neck exam demonstrated no lymphadenopathy or thyromegaly. Examination of the ears demonstrated bilateral recurrent cerumen impactions. Due to the patient complaining of left ear pain, I recommended disimpaction with microscopic guidance. Impression: Resolved oral thrush, bilateral cerumen impactionsrecurrent    Plan: I advised the patient to follow-up with me in 6 months for a cerumen check due to his history. He is reminded to call if he has any further questions or problems regarding the thrush.       Electronically signed by Michelle Laws PA-C on 2/12/21 at 1:01 PM CST

## 2021-02-19 RX ORDER — OFLOXACIN 3 MG/ML
3 SOLUTION/ DROPS OPHTHALMIC 4 TIMES DAILY
Qty: 1 BOTTLE | Refills: 0 | Status: SHIPPED | OUTPATIENT
Start: 2021-02-19 | End: 2021-03-01

## 2021-02-22 ENCOUNTER — TELEPHONE (OUTPATIENT)
Dept: FAMILY MEDICINE CLINIC | Facility: CLINIC | Age: 36
End: 2021-02-22

## 2021-02-22 NOTE — TELEPHONE ENCOUNTER
PATIENT CALLS         STATES HE HAS LOSS OF VISION DUE TO HIS TODDLER SCRATCHING HIS EYE WITH AN  OBJECT       GOOD CONTACT NUMBER   [phone] (H)

## 2021-02-22 NOTE — TELEPHONE ENCOUNTER
Returned call to patient, he says that he is currently at his ophthalmologist's office for work up

## 2021-03-17 RX ORDER — AZITHROMYCIN 250 MG/1
TABLET, FILM COATED ORAL
Qty: 6 TABLET | Refills: 0 | Status: SHIPPED | OUTPATIENT
Start: 2021-03-17 | End: 2021-06-23

## 2021-04-02 DIAGNOSIS — F41.1 GAD (GENERALIZED ANXIETY DISORDER): ICD-10-CM

## 2021-04-02 RX ORDER — BUPROPION HYDROCHLORIDE 150 MG/1
TABLET ORAL
Qty: 90 TABLET | Refills: 1 | Status: SHIPPED | OUTPATIENT
Start: 2021-04-02 | End: 2021-08-20

## 2021-05-06 RX ORDER — OMEPRAZOLE 20 MG/1
20 CAPSULE, DELAYED RELEASE ORAL
Qty: 180 CAPSULE | Refills: 0 | Status: SHIPPED | OUTPATIENT
Start: 2021-05-06

## 2021-06-23 PROCEDURE — U0004 COV-19 TEST NON-CDC HGH THRU: HCPCS | Performed by: FAMILY MEDICINE

## 2021-06-28 DIAGNOSIS — K59.1 FUNCTIONAL DIARRHEA: ICD-10-CM

## 2021-06-28 RX ORDER — COLESEVELAM 180 1/1
1875 TABLET ORAL 2 TIMES DAILY WITH MEALS
Qty: 540 TABLET | Refills: 0 | Status: SHIPPED | OUTPATIENT
Start: 2021-06-28 | End: 2022-11-29

## 2021-06-28 NOTE — TELEPHONE ENCOUNTER
Patient requesting refill on pending medication.    Patient has not been seen in the office in awhile. Please advise

## 2021-07-26 ENCOUNTER — PATIENT MESSAGE (OUTPATIENT)
Dept: FAMILY MEDICINE CLINIC | Facility: CLINIC | Age: 36
End: 2021-07-26

## 2021-07-26 DIAGNOSIS — Z00.00 ANNUAL PHYSICAL EXAM: Primary | ICD-10-CM

## 2021-07-30 NOTE — TELEPHONE ENCOUNTER
From: Juan Gonzalez  To: Yo Martinez DO  Sent: 7/26/2021 11:17 AM CDT  Subject: Non-Urgent Medical Question    I am getting an annual physical next month. Should I go ahead and get some bloodwork done now so that the results would be available by the appointment?

## 2021-08-13 ENCOUNTER — LAB (OUTPATIENT)
Dept: LAB | Facility: HOSPITAL | Age: 36
End: 2021-08-13

## 2021-08-13 DIAGNOSIS — Z00.00 ANNUAL PHYSICAL EXAM: ICD-10-CM

## 2021-08-13 LAB
ALBUMIN SERPL-MCNC: 4.6 G/DL (ref 3.5–5.2)
ALBUMIN/GLOB SERPL: 2.1 G/DL
ALP SERPL-CCNC: 70 U/L (ref 39–117)
ALT SERPL W P-5'-P-CCNC: 15 U/L (ref 1–41)
ANION GAP SERPL CALCULATED.3IONS-SCNC: 7.9 MMOL/L (ref 5–15)
AST SERPL-CCNC: 14 U/L (ref 1–40)
BILIRUB SERPL-MCNC: 1.4 MG/DL (ref 0–1.2)
BUN SERPL-MCNC: 9 MG/DL (ref 6–20)
BUN/CREAT SERPL: 7.8 (ref 7–25)
CALCIUM SPEC-SCNC: 9.3 MG/DL (ref 8.6–10.5)
CHLORIDE SERPL-SCNC: 103 MMOL/L (ref 98–107)
CHOLEST SERPL-MCNC: 142 MG/DL (ref 0–200)
CO2 SERPL-SCNC: 30.1 MMOL/L (ref 22–29)
CREAT SERPL-MCNC: 1.15 MG/DL (ref 0.76–1.27)
DEPRECATED RDW RBC AUTO: 42.2 FL (ref 37–54)
ERYTHROCYTE [DISTWIDTH] IN BLOOD BY AUTOMATED COUNT: 12.9 % (ref 12.3–15.4)
GFR SERPL CREATININE-BSD FRML MDRD: 73 ML/MIN/1.73
GLOBULIN UR ELPH-MCNC: 2.2 GM/DL
GLUCOSE SERPL-MCNC: 97 MG/DL (ref 65–99)
HCT VFR BLD AUTO: 49.7 % (ref 37.5–51)
HDLC SERPL-MCNC: 63 MG/DL (ref 40–60)
HGB BLD-MCNC: 16.2 G/DL (ref 13–17.7)
LDLC SERPL CALC-MCNC: 64 MG/DL (ref 0–100)
LDLC/HDLC SERPL: 1 {RATIO}
MCH RBC QN AUTO: 28.8 PG (ref 26.6–33)
MCHC RBC AUTO-ENTMCNC: 32.6 G/DL (ref 31.5–35.7)
MCV RBC AUTO: 88.3 FL (ref 79–97)
PLATELET # BLD AUTO: 252 10*3/MM3 (ref 140–450)
PMV BLD AUTO: 10.6 FL (ref 6–12)
POTASSIUM SERPL-SCNC: 4.1 MMOL/L (ref 3.5–5.2)
PROT SERPL-MCNC: 6.8 G/DL (ref 6–8.5)
RBC # BLD AUTO: 5.63 10*6/MM3 (ref 4.14–5.8)
SODIUM SERPL-SCNC: 141 MMOL/L (ref 136–145)
TRIGL SERPL-MCNC: 80 MG/DL (ref 0–150)
VLDLC SERPL-MCNC: 15 MG/DL (ref 5–40)
WBC # BLD AUTO: 6.95 10*3/MM3 (ref 3.4–10.8)

## 2021-08-13 PROCEDURE — 80053 COMPREHEN METABOLIC PANEL: CPT

## 2021-08-13 PROCEDURE — 36415 COLL VENOUS BLD VENIPUNCTURE: CPT

## 2021-08-13 PROCEDURE — 80061 LIPID PANEL: CPT

## 2021-08-13 PROCEDURE — 85027 COMPLETE CBC AUTOMATED: CPT

## 2021-08-20 ENCOUNTER — OFFICE VISIT (OUTPATIENT)
Dept: FAMILY MEDICINE CLINIC | Facility: CLINIC | Age: 36
End: 2021-08-20

## 2021-08-20 VITALS
DIASTOLIC BLOOD PRESSURE: 79 MMHG | TEMPERATURE: 98 F | HEART RATE: 90 BPM | HEIGHT: 65 IN | OXYGEN SATURATION: 99 % | SYSTOLIC BLOOD PRESSURE: 128 MMHG | WEIGHT: 202 LBS | BODY MASS INDEX: 33.66 KG/M2

## 2021-08-20 DIAGNOSIS — F17.201 TOBACCO ABUSE, IN REMISSION: ICD-10-CM

## 2021-08-20 DIAGNOSIS — E66.9 OBESITY (BMI 30-39.9): ICD-10-CM

## 2021-08-20 DIAGNOSIS — F41.1 GAD (GENERALIZED ANXIETY DISORDER): ICD-10-CM

## 2021-08-20 DIAGNOSIS — Z00.00 ANNUAL PHYSICAL EXAM: Primary | ICD-10-CM

## 2021-08-20 PROCEDURE — 99214 OFFICE O/P EST MOD 30 MIN: CPT | Performed by: FAMILY MEDICINE

## 2021-08-20 PROCEDURE — 99395 PREV VISIT EST AGE 18-39: CPT | Performed by: FAMILY MEDICINE

## 2021-08-20 RX ORDER — FLUOXETINE HYDROCHLORIDE 20 MG/1
20 CAPSULE ORAL DAILY
Qty: 30 CAPSULE | Refills: 1 | Status: SHIPPED | OUTPATIENT
Start: 2021-08-20 | End: 2021-10-22

## 2021-08-20 NOTE — PROGRESS NOTES
"Chief Complaint  Annual Exam (ANASTACIO-7=20, PHQ-9=17), Neck Pain (current pain 4/10, worsening over the last few months ), and ANASTACIO (generalized anxiety disorder) (currently taking wellbutrin, doesn't seem to help )    Subjective     {Problem List  Visit Diagnosis   Encounters  Notes  Medications  Labs  Result Review Imaging  Media :23}     Juan Gonzalez presents to [facility]  History of Present Illness  Here for annual exam reporting uncontrolled anxiety despite treatment with Wellbutrin in the setting of increased stress at work.  Over the past few months he has been developing worsening moderate aching neck pain, ROS otherwise negative.    Current Outpatient Medications   Medication Instructions   • clobetasol (TEMOVATE) 0.05 % ointment Topical, As Needed   • colesevelam (WELCHOL) 1,875 mg, Oral, 2 Times Daily With Meals   • FLUoxetine (PROZAC) 20 mg, Oral, Daily   • omeprazole (priLOSEC) 20 MG capsule TAKE 1 CAPSULE BY MOUTH EVERY DAY   • promethazine-dextromethorphan (PROMETHAZINE-DM) 6.25-15 MG/5ML syrup 5 mL, Oral, Nightly PRN     Past Medical History:   Diagnosis Date   • Acid reflux    • Allergic     improved      Past Surgical History:   Procedure Laterality Date   • CHOLECYSTECTOMY     • VASECTOMY       No Known Allergies  Family History   Problem Relation Age of Onset   • Aneurysm Maternal Grandmother    • Aneurysm Paternal Grandmother    • Dementia Paternal Grandmother    • Heart disease Paternal Grandfather        Objective   Vital Signs:   /79 (BP Location: Left arm, Patient Position: Sitting, Cuff Size: Adult)   Pulse 90   Temp 98 °F (36.7 °C) (Temporal)   Ht 165.1 cm (65\")   Wt 91.6 kg (202 lb)   SpO2 99%   BMI 33.61 kg/m²     Physical Exam  Constitutional:       General: He is not in acute distress.     Appearance: Normal appearance. He is not ill-appearing or diaphoretic.   HENT:      Head: Normocephalic and atraumatic.      Right Ear: Tympanic " membrane and external ear normal.      Left Ear: Tympanic membrane and external ear normal.      Nose: Nose normal. No congestion or rhinorrhea.      Mouth/Throat:      Mouth: Mucous membranes are moist.      Pharynx: Oropharynx is clear. No oropharyngeal exudate or posterior oropharyngeal erythema.   Eyes:      General: No scleral icterus.        Right eye: No discharge.         Left eye: No discharge.      Extraocular Movements: Extraocular movements intact.      Conjunctiva/sclera: Conjunctivae normal.      Pupils: Pupils are equal, round, and reactive to light.   Neck:      Thyroid: No thyromegaly.      Vascular: No JVD.   Cardiovascular:      Rate and Rhythm: Normal rate and regular rhythm.      Pulses: Normal pulses.      Heart sounds: Normal heart sounds. No murmur heard.   No friction rub. No gallop.    Pulmonary:      Effort: Pulmonary effort is normal.      Breath sounds: Normal breath sounds.   Abdominal:      General: Bowel sounds are normal. There is no distension.      Palpations: Abdomen is soft. There is no mass.      Tenderness: There is no abdominal tenderness. There is no guarding or rebound.   Musculoskeletal:         General: No deformity or signs of injury.      Cervical back: Neck supple.      Right lower leg: No edema.      Left lower leg: No edema.      Comments: Moderate paraspinal spasm of cervical spine   Lymphadenopathy:      Cervical: No cervical adenopathy.   Skin:     General: Skin is warm and dry.      Capillary Refill: Capillary refill takes less than 2 seconds.      Coloration: Skin is not jaundiced or pale.   Neurological:      Mental Status: He is alert and oriented to person, place, and time. Mental status is at baseline.   Psychiatric:         Mood and Affect: Mood is anxious.         Behavior: Behavior normal.         Thought Content: Thought content normal.         Judgment: Judgment normal.        Result Review :                 Assessment and Plan    Diagnoses and all  orders for this visit:    1. Annual physical exam (Primary)    2. ANASTACIO (generalized anxiety disorder)  -     FLUoxetine (PROzac) 20 MG capsule; Take 1 capsule by mouth Daily.  Dispense: 30 capsule; Refill: 1    3. Obesity (BMI 30-39.9)    4. Tobacco abuse, in remission    Encourage exercise and stretching  Patient has stopped using tobacco, continue cessation  Prozac trial  Follow-up 4 weeks      Follow Up   Return in about 4 weeks (around 9/17/2021).  Patient was given instructions and counseling regarding his condition or for health maintenance advice. Please see specific information pulled into the AVS if appropriate.

## 2021-10-18 ENCOUNTER — TELEPHONE (OUTPATIENT)
Dept: FAMILY MEDICINE CLINIC | Facility: CLINIC | Age: 36
End: 2021-10-18

## 2021-10-22 DIAGNOSIS — F41.1 GAD (GENERALIZED ANXIETY DISORDER): ICD-10-CM

## 2021-10-22 RX ORDER — FLUOXETINE HYDROCHLORIDE 20 MG/1
CAPSULE ORAL
Qty: 30 CAPSULE | Refills: 1 | Status: SHIPPED | OUTPATIENT
Start: 2021-10-22 | End: 2021-10-27 | Stop reason: SDUPTHER

## 2021-10-27 DIAGNOSIS — F41.1 GAD (GENERALIZED ANXIETY DISORDER): ICD-10-CM

## 2021-10-27 RX ORDER — FLUOXETINE HYDROCHLORIDE 20 MG/1
20 CAPSULE ORAL DAILY
Qty: 90 CAPSULE | Refills: 0 | Status: SHIPPED | OUTPATIENT
Start: 2021-10-27 | End: 2022-01-14 | Stop reason: SDUPTHER

## 2021-11-21 ENCOUNTER — APPOINTMENT (OUTPATIENT)
Dept: GENERAL RADIOLOGY | Facility: HOSPITAL | Age: 36
End: 2021-11-21

## 2021-11-21 ENCOUNTER — HOSPITAL ENCOUNTER (EMERGENCY)
Facility: HOSPITAL | Age: 36
Discharge: HOME | End: 2021-11-21
Attending: EMERGENCY MEDICINE | Admitting: EMERGENCY MEDICINE

## 2021-11-21 VITALS
OXYGEN SATURATION: 97 % | HEIGHT: 65 IN | DIASTOLIC BLOOD PRESSURE: 89 MMHG | TEMPERATURE: 98.3 F | SYSTOLIC BLOOD PRESSURE: 151 MMHG | BODY MASS INDEX: 34.32 KG/M2 | WEIGHT: 206 LBS | RESPIRATION RATE: 18 BRPM | HEART RATE: 106 BPM

## 2021-11-21 DIAGNOSIS — T59.811A SMOKE INHALATION: Primary | ICD-10-CM

## 2021-11-21 LAB
A-A DO2: 11 MMHG
ARTERIAL PATENCY WRIST A: ABNORMAL
ATMOSPHERIC PRESS: 752 MMHG
BASE EXCESS BLDA CALC-SCNC: 1.7 MMOL/L (ref 0–2)
BDY SITE: ABNORMAL
BODY TEMPERATURE: 37 C
COHGB MFR BLD: 1.1 % (ref 0–5)
HCO3 BLDA-SCNC: 26.3 MMOL/L (ref 20–26)
HCT VFR BLD CALC: 49.8 % (ref 38–51)
HGB BLDA-MCNC: 16.2 G/DL (ref 14–18)
Lab: ABNORMAL
METHGB BLD QL: 0.8 % (ref 0–3)
MODALITY: ABNORMAL
NOTE: ABNORMAL
OXYHGB MFR BLDV: 96.3 % (ref 94–99)
PCO2 BLDA: 40.5 MM HG (ref 35–45)
PCO2 TEMP ADJ BLD: 40.5 MM HG (ref 35–45)
PH BLDA: 7.42 PH UNITS (ref 7.35–7.45)
PH, TEMP CORRECTED: 7.42 PH UNITS (ref 7.35–7.45)
PO2 BLDA: 91.3 MM HG (ref 83–108)
PO2 TEMP ADJ BLD: 91.3 MM HG (ref 83–108)
POTASSIUM BLDA-SCNC: 4.1 MMOL/L (ref 3.5–5.2)
SAO2 % BLDCOA: 98.2 % (ref 94–99)
SODIUM BLDA-SCNC: 142 MMOL/L (ref 136–145)
VENTILATOR MODE: ABNORMAL

## 2021-11-21 PROCEDURE — 36600 WITHDRAWAL OF ARTERIAL BLOOD: CPT

## 2021-11-21 PROCEDURE — 83050 HGB METHEMOGLOBIN QUAN: CPT

## 2021-11-21 PROCEDURE — 82375 ASSAY CARBOXYHB QUANT: CPT

## 2021-11-21 PROCEDURE — 71046 X-RAY EXAM CHEST 2 VIEWS: CPT

## 2021-11-21 PROCEDURE — 82805 BLOOD GASES W/O2 SATURATION: CPT

## 2021-11-21 PROCEDURE — 99283 EMERGENCY DEPT VISIT LOW MDM: CPT

## 2021-11-21 RX ORDER — COLESEVELAM 180 1/1
625 TABLET ORAL 3 TIMES DAILY
COMMUNITY
End: 2022-11-29
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
## 2021-11-22 ENCOUNTER — PATIENT MESSAGE (OUTPATIENT)
Dept: FAMILY MEDICINE CLINIC | Facility: CLINIC | Age: 36
End: 2021-11-22

## 2021-11-22 DIAGNOSIS — F41.9 ANXIETY: Primary | ICD-10-CM

## 2021-11-22 RX ORDER — LORAZEPAM 0.5 MG/1
0.5 TABLET ORAL EVERY 8 HOURS PRN
Qty: 20 TABLET | Refills: 1 | Status: SHIPPED | OUTPATIENT
Start: 2021-11-22 | End: 2022-03-31
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
## 2021-11-29 RX ORDER — ALPRAZOLAM 0.5 MG/1
0.5 TABLET ORAL 2 TIMES DAILY PRN
Qty: 14 TABLET | Refills: 0 | Status: SHIPPED | OUTPATIENT
Start: 2021-11-29 | End: 2022-03-31

## 2022-01-14 DIAGNOSIS — F41.1 GAD (GENERALIZED ANXIETY DISORDER): ICD-10-CM

## 2022-01-14 RX ORDER — FLUOXETINE HYDROCHLORIDE 20 MG/1
20 CAPSULE ORAL DAILY
Qty: 30 CAPSULE | Refills: 0 | Status: SHIPPED | OUTPATIENT
Start: 2022-01-14 | End: 2022-02-16 | Stop reason: SDUPTHER

## 2022-01-14 NOTE — TELEPHONE ENCOUNTER
Jose Luis mendes at [facility] called in regards to patient's refill on prozac. Medication called in with qty 30 and no refills. Patient will need to be seen for future refills.

## 2022-02-16 ENCOUNTER — PATIENT MESSAGE (OUTPATIENT)
Dept: FAMILY MEDICINE CLINIC | Facility: CLINIC | Age: 37
End: 2022-02-16

## 2022-02-16 DIAGNOSIS — F41.1 GAD (GENERALIZED ANXIETY DISORDER): ICD-10-CM

## 2022-02-16 RX ORDER — FLUOXETINE HYDROCHLORIDE 40 MG/1
40 CAPSULE ORAL DAILY
Qty: 90 CAPSULE | Refills: 1 | Status: SHIPPED | OUTPATIENT
Start: 2022-02-16 | End: 2022-08-15 | Stop reason: SDUPTHER

## 2022-02-17 NOTE — TELEPHONE ENCOUNTER
From: Juan Gonzalez  To: Yo Martinez, DO  Sent: 2/16/2022 7:54 AM CST  Subject: Prozac    Hey Dr. Martinez,    A few months ago you increased my Prozac to 40 mg's a day, that really seems to be helping.     Can you put that order in and send to  Pharmacy in [facility] (I switched to that pharmacy)?    I am completely out at the moment...    Thanks,

## 2022-08-15 DIAGNOSIS — K59.1 FUNCTIONAL DIARRHEA: ICD-10-CM

## 2022-08-15 DIAGNOSIS — F41.1 GAD (GENERALIZED ANXIETY DISORDER): ICD-10-CM

## 2022-08-15 RX ORDER — COLESEVELAM 180 1/1
1875 TABLET ORAL 2 TIMES DAILY WITH MEALS
Qty: 540 TABLET | Refills: 0 | Status: CANCELLED | OUTPATIENT
Start: 2022-08-15 | End: 2022-11-13

## 2022-08-16 RX ORDER — FLUOXETINE HYDROCHLORIDE 40 MG/1
40 CAPSULE ORAL DAILY
Qty: 90 CAPSULE | Refills: 1 | Status: SHIPPED | OUTPATIENT
Start: 2022-08-16 | End: 2023-02-17 | Stop reason: SDUPTHER

## 2022-08-16 NOTE — TELEPHONE ENCOUNTER
Rx Refill Note  Requested Prescriptions     Pending Prescriptions Disp Refills   • FLUoxetine (PROzac) 40 MG capsule 90 capsule 1     Sig: Take 1 capsule by mouth Daily.      Last office visit with prescribing clinician: 8/20/2021      Next office visit with prescribing clinician: Visit date not found            AISHA Pate  08/16/22, 14:39 CDT

## 2022-10-25 ENCOUNTER — PATIENT MESSAGE (OUTPATIENT)
Dept: FAMILY MEDICINE CLINIC | Facility: CLINIC | Age: 37
End: 2022-10-25

## 2022-10-25 DIAGNOSIS — R11.0 NAUSEA: Primary | ICD-10-CM

## 2022-10-27 RX ORDER — ONDANSETRON 8 MG/1
8 TABLET, ORALLY DISINTEGRATING ORAL EVERY 8 HOURS PRN
Qty: 30 TABLET | Refills: 0 | Status: SHIPPED | OUTPATIENT
Start: 2022-10-27
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
## 2022-12-07 PROCEDURE — 87636 SARSCOV2 & INF A&B AMP PRB: CPT | Performed by: NURSE PRACTITIONER

## 2023-02-06 ENCOUNTER — TELEPHONE (OUTPATIENT)
Dept: FAMILY MEDICINE CLINIC | Facility: CLINIC | Age: 38
End: 2023-02-06
Payer: COMMERCIAL

## 2023-02-06 DIAGNOSIS — Z00.00 ANNUAL PHYSICAL EXAM: Primary | ICD-10-CM

## 2023-02-06 NOTE — TELEPHONE ENCOUNTER
Pt Rosalina appt for first of March for his Annual Physical, He would like to have some blood work done prior to appt. Can orders be put in?

## 2023-02-17 DIAGNOSIS — F41.1 GAD (GENERALIZED ANXIETY DISORDER): ICD-10-CM

## 2023-02-17 RX ORDER — FLUOXETINE HYDROCHLORIDE 40 MG/1
40 CAPSULE ORAL DAILY
Qty: 90 CAPSULE | Refills: 1 | Status: SHIPPED | OUTPATIENT
Start: 2023-02-17

## 2023-08-25 DIAGNOSIS — F41.1 GAD (GENERALIZED ANXIETY DISORDER): ICD-10-CM

## 2023-08-28 RX ORDER — FLUOXETINE HYDROCHLORIDE 40 MG/1
40 CAPSULE ORAL DAILY
Qty: 90 CAPSULE | Refills: 1 | Status: SHIPPED | OUTPATIENT
Start: 2023-08-28

## 2023-09-28 NOTE — TELEPHONE ENCOUNTER
----- Message from Trace Mills MD sent at 3/1/2018  7:23 AM CST -----  Please notify patient of normal results. Normal xray. Please start prilosec 20 mg po daily. Neck , no lymphadenopathy

## 2024-03-04 DIAGNOSIS — F41.1 GAD (GENERALIZED ANXIETY DISORDER): ICD-10-CM

## 2024-03-04 RX ORDER — FLUOXETINE HYDROCHLORIDE 40 MG/1
40 CAPSULE ORAL DAILY
Qty: 90 CAPSULE | Refills: 1 | OUTPATIENT
Start: 2024-03-04

## 2024-03-05 DIAGNOSIS — F41.1 GAD (GENERALIZED ANXIETY DISORDER): ICD-10-CM

## 2024-03-06 RX ORDER — FLUOXETINE HYDROCHLORIDE 40 MG/1
40 CAPSULE ORAL DAILY
Qty: 90 CAPSULE | Refills: 1 | OUTPATIENT
Start: 2024-03-06

## 2024-04-12 ENCOUNTER — LAB (OUTPATIENT)
Dept: LAB | Facility: HOSPITAL | Age: 39
End: 2024-04-12
Payer: COMMERCIAL

## 2024-04-12 ENCOUNTER — OFFICE VISIT (OUTPATIENT)
Dept: FAMILY MEDICINE CLINIC | Facility: CLINIC | Age: 39
End: 2024-04-12
Payer: COMMERCIAL

## 2024-04-12 VITALS
OXYGEN SATURATION: 98 % | WEIGHT: 246 LBS | SYSTOLIC BLOOD PRESSURE: 149 MMHG | BODY MASS INDEX: 40.98 KG/M2 | HEIGHT: 65 IN | HEART RATE: 70 BPM | RESPIRATION RATE: 18 BRPM | TEMPERATURE: 97.7 F | DIASTOLIC BLOOD PRESSURE: 82 MMHG

## 2024-04-12 DIAGNOSIS — Z11.59 ENCOUNTER FOR HEPATITIS C SCREENING TEST FOR LOW RISK PATIENT: ICD-10-CM

## 2024-04-12 DIAGNOSIS — Z00.00 ANNUAL PHYSICAL EXAM: ICD-10-CM

## 2024-04-12 DIAGNOSIS — R63.5 WEIGHT GAIN: ICD-10-CM

## 2024-04-12 DIAGNOSIS — R11.0 NAUSEA: ICD-10-CM

## 2024-04-12 DIAGNOSIS — F41.1 GAD (GENERALIZED ANXIETY DISORDER): ICD-10-CM

## 2024-04-12 DIAGNOSIS — R21 RASH AND NONSPECIFIC SKIN ERUPTION: ICD-10-CM

## 2024-04-12 DIAGNOSIS — E66.01 MORBID (SEVERE) OBESITY DUE TO EXCESS CALORIES: ICD-10-CM

## 2024-04-12 DIAGNOSIS — Z00.00 ANNUAL PHYSICAL EXAM: Primary | ICD-10-CM

## 2024-04-12 DIAGNOSIS — K21.9 GASTROESOPHAGEAL REFLUX DISEASE, UNSPECIFIED WHETHER ESOPHAGITIS PRESENT: ICD-10-CM

## 2024-04-12 LAB
ALBUMIN SERPL-MCNC: 4.6 G/DL (ref 3.5–5.2)
ALBUMIN/GLOB SERPL: 1.4 G/DL
ALP SERPL-CCNC: 102 U/L (ref 39–117)
ALT SERPL W P-5'-P-CCNC: 17 U/L (ref 1–41)
ANION GAP SERPL CALCULATED.3IONS-SCNC: 8 MMOL/L (ref 5–15)
AST SERPL-CCNC: 17 U/L (ref 1–40)
BILIRUB SERPL-MCNC: 1 MG/DL (ref 0–1.2)
BUN SERPL-MCNC: 16 MG/DL (ref 6–20)
BUN/CREAT SERPL: 18.6 (ref 7–25)
CALCIUM SPEC-SCNC: 10.2 MG/DL (ref 8.6–10.5)
CHLORIDE SERPL-SCNC: 100 MMOL/L (ref 98–107)
CHOLEST SERPL-MCNC: 162 MG/DL (ref 0–200)
CO2 SERPL-SCNC: 32 MMOL/L (ref 22–29)
CREAT SERPL-MCNC: 0.86 MG/DL (ref 0.76–1.27)
DEPRECATED RDW RBC AUTO: 41.3 FL (ref 37–54)
EGFRCR SERPLBLD CKD-EPI 2021: 115.1 ML/MIN/1.73
ERYTHROCYTE [DISTWIDTH] IN BLOOD BY AUTOMATED COUNT: 12.6 % (ref 12.3–15.4)
GLOBULIN UR ELPH-MCNC: 3.2 GM/DL
GLUCOSE SERPL-MCNC: 97 MG/DL (ref 65–99)
HBA1C MFR BLD: 5 % (ref 4.8–5.6)
HCT VFR BLD AUTO: 45.2 % (ref 37.5–51)
HCV AB SER DONR QL: NORMAL
HDLC SERPL-MCNC: 54 MG/DL (ref 40–60)
HGB BLD-MCNC: 14.9 G/DL (ref 13–17.7)
LDLC SERPL CALC-MCNC: 91 MG/DL (ref 0–100)
LDLC/HDLC SERPL: 1.66 {RATIO}
MCH RBC QN AUTO: 29.7 PG (ref 26.6–33)
MCHC RBC AUTO-ENTMCNC: 33 G/DL (ref 31.5–35.7)
MCV RBC AUTO: 90.2 FL (ref 79–97)
PLATELET # BLD AUTO: 289 10*3/MM3 (ref 140–450)
PMV BLD AUTO: 9.9 FL (ref 6–12)
POTASSIUM SERPL-SCNC: 4.3 MMOL/L (ref 3.5–5.2)
PROT SERPL-MCNC: 7.8 G/DL (ref 6–8.5)
RBC # BLD AUTO: 5.01 10*6/MM3 (ref 4.14–5.8)
SODIUM SERPL-SCNC: 140 MMOL/L (ref 136–145)
T4 FREE SERPL-MCNC: 0.99 NG/DL (ref 0.93–1.7)
TESTOST SERPL-MCNC: 495 NG/DL (ref 249–836)
TRIGL SERPL-MCNC: 91 MG/DL (ref 0–150)
TSH SERPL DL<=0.05 MIU/L-ACNC: 1.3 UIU/ML (ref 0.27–4.2)
VLDLC SERPL-MCNC: 17 MG/DL (ref 5–40)
WBC NRBC COR # BLD AUTO: 5.59 10*3/MM3 (ref 3.4–10.8)

## 2024-04-12 PROCEDURE — 80061 LIPID PANEL: CPT

## 2024-04-12 PROCEDURE — 99395 PREV VISIT EST AGE 18-39: CPT | Performed by: FAMILY MEDICINE

## 2024-04-12 PROCEDURE — 83036 HEMOGLOBIN GLYCOSYLATED A1C: CPT

## 2024-04-12 PROCEDURE — 86803 HEPATITIS C AB TEST: CPT

## 2024-04-12 PROCEDURE — 84403 ASSAY OF TOTAL TESTOSTERONE: CPT

## 2024-04-12 PROCEDURE — 80050 GENERAL HEALTH PANEL: CPT

## 2024-04-12 PROCEDURE — 36415 COLL VENOUS BLD VENIPUNCTURE: CPT

## 2024-04-12 PROCEDURE — 84439 ASSAY OF FREE THYROXINE: CPT

## 2024-04-12 RX ORDER — FLUOXETINE HYDROCHLORIDE 40 MG/1
40 CAPSULE ORAL DAILY
Qty: 90 CAPSULE | Refills: 3 | Status: SHIPPED | OUTPATIENT
Start: 2024-04-12

## 2024-04-12 RX ORDER — ONDANSETRON 8 MG/1
8 TABLET, ORALLY DISINTEGRATING ORAL EVERY 8 HOURS PRN
Qty: 30 TABLET | Refills: 0 | Status: SHIPPED | OUTPATIENT
Start: 2024-04-12

## 2024-04-12 RX ORDER — CLOBETASOL PROPIONATE 0.5 MG/G
OINTMENT TOPICAL AS NEEDED
Qty: 60 G | Refills: 2 | Status: SHIPPED | OUTPATIENT
Start: 2024-04-12

## 2024-04-15 NOTE — PROGRESS NOTES
"Chief Complaint  Annual Exam and Med Refill    Subjective        Juan Gonzalez presents to [facility]  History of Present Illness  Patient is here for annual exam, has been out of his Prozac so he needs a refill, as result he has been a little more anxious than usual but felt like the medicine previously had good control.  Interested in losing weight, blood pressure is elevated today.  ROS otherwise negative    Past Medical History:   Diagnosis Date    Acid reflux     Allergic     improved      Past Surgical History:   Procedure Laterality Date    CHOLECYSTECTOMY      VASECTOMY       Current Outpatient Medications   Medication Instructions    brompheniramine-pseudoephedrine-DM 30-2-10 MG/5ML syrup 10 mL, Oral, 3 Times Daily PRN    clobetasol (TEMOVATE) 0.05 % ointment Topical, As Needed    FLUoxetine (PROZAC) 40 mg, Oral, Daily    omeprazole (priLOSEC) 20 MG capsule TAKE 1 CAPSULE BY MOUTH EVERY DAY    ondansetron ODT (ZOFRAN ODT) 8 mg, Translingual, Every 8 Hours PRN     No Known Allergies  Family History   Problem Relation Age of Onset    Aneurysm Maternal Grandmother     Aneurysm Paternal Grandmother     Dementia Paternal Grandmother     Heart disease Paternal Grandfather      Social History     Tobacco Use    Smoking status: Former     Current packs/day: 0.25     Average packs/day: 0.3 packs/day for 5.0 years (1.3 ttl pk-yrs)     Types: Cigarettes    Smokeless tobacco: Never   Vaping Use    Vaping status: Never Used   Substance Use Topics    Alcohol use: Not Currently     Comment: quit two months ago    Drug use: No       Objective   Vital Signs:  /82   Pulse 70   Temp 97.7 °F (36.5 °C)   Resp 18   Ht 165.1 cm (65\")   Wt 112 kg (246 lb)   SpO2 98%   BMI 40.94 kg/m²   Estimated body mass index is 40.94 kg/m² as calculated from the following:    Height as of this encounter: 165.1 cm (65\").    Weight as of this encounter: 112 kg (246 lb).             Physical " Exam  Constitutional:       General: He is not in acute distress.     Appearance: Normal appearance. He is morbidly obese. He is not ill-appearing or diaphoretic.   HENT:      Head: Normocephalic and atraumatic.      Right Ear: Tympanic membrane and external ear normal.      Left Ear: Tympanic membrane and external ear normal.      Nose: Nose normal. No congestion or rhinorrhea.      Mouth/Throat:      Mouth: Mucous membranes are moist.      Pharynx: Oropharynx is clear. No oropharyngeal exudate or posterior oropharyngeal erythema.   Eyes:      General: No scleral icterus.        Right eye: No discharge.         Left eye: No discharge.      Extraocular Movements: Extraocular movements intact.      Conjunctiva/sclera: Conjunctivae normal.      Pupils: Pupils are equal, round, and reactive to light.   Neck:      Thyroid: No thyromegaly.      Vascular: No JVD.   Cardiovascular:      Rate and Rhythm: Normal rate and regular rhythm.      Pulses: Normal pulses.      Heart sounds: Normal heart sounds. No murmur heard.     No friction rub. No gallop.   Pulmonary:      Effort: Pulmonary effort is normal.      Breath sounds: Normal breath sounds.   Abdominal:      General: Bowel sounds are normal. There is no distension.      Palpations: Abdomen is soft. There is no mass.      Tenderness: There is no abdominal tenderness. There is no guarding or rebound.   Musculoskeletal:         General: No deformity or signs of injury.      Cervical back: Neck supple.      Right lower leg: No edema.      Left lower leg: No edema.   Lymphadenopathy:      Cervical: No cervical adenopathy.   Skin:     General: Skin is warm and dry.      Capillary Refill: Capillary refill takes less than 2 seconds.      Coloration: Skin is not jaundiced or pale.      Comments: Psoriasis on hands   Neurological:      Mental Status: He is alert and oriented to person, place, and time. Mental status is at baseline.   Psychiatric:         Mood and Affect: Mood  normal.         Behavior: Behavior normal.         Thought Content: Thought content normal.         Judgment: Judgment normal.        Result Review :                     Assessment and Plan     Diagnoses and all orders for this visit:    1. Annual physical exam (Primary)  -     CBC No Differential; Future  -     Comprehensive Metabolic Panel; Future  -     Lipid panel; Future    2. ANASTACIO (generalized anxiety disorder)  -     FLUoxetine (PROzac) 40 MG capsule; Take 1 capsule by mouth Daily.  Dispense: 90 capsule; Refill: 3  -     Testosterone; Future    3. Nausea  -     ondansetron ODT (Zofran ODT) 8 MG disintegrating tablet; Place 1 tablet on the tongue Every 8 (Eight) Hours As Needed for Nausea or Vomiting.  Dispense: 30 tablet; Refill: 0    4. Encounter for hepatitis C screening test for low risk patient  -     Hepatitis C antibody; Future    5. Rash and nonspecific skin eruption  -     clobetasol (TEMOVATE) 0.05 % ointment; Apply  topically to the appropriate area as directed As Needed (rash).  Dispense: 60 g; Refill: 2    6. Morbid (severe) obesity due to excess calories  -     TSH; Future  -     T4, free; Future  -     Hemoglobin A1c; Future  -     Testosterone; Future    7. Weight gain  -     Testosterone; Future    8. Gastroesophageal reflux disease, unspecified whether esophagitis present    Encourage weight loss for preventative med, labs above  Interested in testosterone levels given recent weight gain, stress also likely  Refill clobetasol for rash  Home BP monitoring       Follow Up     Return in about 6 months (around 10/12/2024) for Recheck.  Patient was given instructions and counseling regarding his condition or for health maintenance advice. Please see specific information pulled into the AVS if appropriate.

## 2025-03-06 DIAGNOSIS — F41.1 GAD (GENERALIZED ANXIETY DISORDER): ICD-10-CM

## 2025-03-06 RX ORDER — FLUOXETINE HYDROCHLORIDE 40 MG/1
40 CAPSULE ORAL DAILY
Qty: 90 CAPSULE | Refills: 3 | OUTPATIENT
Start: 2025-03-06

## 2025-03-06 RX ORDER — FLUOXETINE HYDROCHLORIDE 40 MG/1
40 CAPSULE ORAL DAILY
Qty: 90 CAPSULE | Refills: 2 | OUTPATIENT
Start: 2025-03-06

## 2025-04-17 DIAGNOSIS — F41.1 GAD (GENERALIZED ANXIETY DISORDER): ICD-10-CM

## 2025-04-17 RX ORDER — FLUOXETINE HYDROCHLORIDE 40 MG/1
40 CAPSULE ORAL DAILY
Qty: 90 CAPSULE | Refills: 2 | OUTPATIENT
Start: 2025-04-17

## 2025-04-17 RX ORDER — FLUOXETINE HYDROCHLORIDE 40 MG/1
40 CAPSULE ORAL DAILY
Qty: 90 CAPSULE | Refills: 3 | OUTPATIENT
Start: 2025-04-17

## 2025-04-21 DIAGNOSIS — F41.1 GAD (GENERALIZED ANXIETY DISORDER): ICD-10-CM

## 2025-04-21 RX ORDER — FLUOXETINE HYDROCHLORIDE 40 MG/1
40 CAPSULE ORAL DAILY
Qty: 90 CAPSULE | Refills: 3 | OUTPATIENT
Start: 2025-04-21

## 2025-04-21 NOTE — TELEPHONE ENCOUNTER
Caller: Juan Gonzalez    Relationship: Self    Best call back number:   [phone]   Requested Prescriptions:   Requested Prescriptions     Pending Prescriptions Disp Refills    FLUoxetine (PROzac) 40 MG capsule 90 capsule 3     Sig: Take 1 capsule by mouth Daily.        Pharmacy where request should be sent: [facility] - [place], [place] - [address] [facility] [address] - [phone]  - [phone]     Last office visit with prescribing clinician: 4/12/2024   Last telemedicine visit with prescribing clinician: Visit date not found   Next office visit with prescribing clinician: Visit date not found     Additional details provided by patient: MADE APPT BUT NEEDS BEFORE WORK TRIP    Does the patient have less than a 3 day supply:  [x] Yes  [] No    Would you like a call back once the refill request has been completed: [x] Yes [] No    If the office needs to give you a call back, can they leave a voicemail: [x] Yes [] No    Tami Thomas Rep   04/21/25 09:52 CDT

## 2025-04-22 DIAGNOSIS — F41.1 GAD (GENERALIZED ANXIETY DISORDER): ICD-10-CM

## 2025-04-22 RX ORDER — FLUOXETINE HYDROCHLORIDE 40 MG/1
40 CAPSULE ORAL DAILY
Qty: 90 CAPSULE | Refills: 2 | OUTPATIENT
Start: 2025-04-22

## 2025-04-23 DIAGNOSIS — F41.1 GAD (GENERALIZED ANXIETY DISORDER): ICD-10-CM

## 2025-04-23 RX ORDER — FLUOXETINE HYDROCHLORIDE 40 MG/1
40 CAPSULE ORAL DAILY
Qty: 30 CAPSULE | Refills: 0 | Status: SHIPPED | OUTPATIENT
Start: 2025-04-23

## 2025-05-01 ENCOUNTER — OFFICE VISIT (OUTPATIENT)
Dept: FAMILY MEDICINE CLINIC | Facility: CLINIC | Age: 40
End: 2025-05-01
Payer: COMMERCIAL

## 2025-05-01 VITALS
SYSTOLIC BLOOD PRESSURE: 110 MMHG | TEMPERATURE: 97.7 F | DIASTOLIC BLOOD PRESSURE: 82 MMHG | WEIGHT: 247 LBS | HEART RATE: 85 BPM | BODY MASS INDEX: 41.15 KG/M2 | HEIGHT: 65 IN | OXYGEN SATURATION: 98 %

## 2025-05-01 DIAGNOSIS — K21.9 GASTROESOPHAGEAL REFLUX DISEASE, UNSPECIFIED WHETHER ESOPHAGITIS PRESENT: ICD-10-CM

## 2025-05-01 DIAGNOSIS — E66.01 MORBID (SEVERE) OBESITY DUE TO EXCESS CALORIES: ICD-10-CM

## 2025-05-01 DIAGNOSIS — F41.1 GAD (GENERALIZED ANXIETY DISORDER): ICD-10-CM

## 2025-05-01 DIAGNOSIS — R21 RASH AND NONSPECIFIC SKIN ERUPTION: ICD-10-CM

## 2025-05-01 DIAGNOSIS — Z00.00 ANNUAL PHYSICAL EXAM: Primary | ICD-10-CM

## 2025-05-01 RX ORDER — FLUOXETINE HYDROCHLORIDE 40 MG/1
40 CAPSULE ORAL DAILY
Qty: 30 CAPSULE | Refills: 0 | Status: SHIPPED | OUTPATIENT
Start: 2025-05-01

## 2025-05-01 RX ORDER — CLOBETASOL PROPIONATE 0.5 MG/G
OINTMENT TOPICAL AS NEEDED
Qty: 60 G | Refills: 2 | Status: SHIPPED | OUTPATIENT
Start: 2025-05-01

## 2025-05-02 NOTE — PROGRESS NOTES
"Chief Complaint  Annual Exam (Patient presents to clinic for an annual exam with medication refills)    Subjective        Juan Gonzalez presents to [facility]  History of Present Illness  Patient is here for annual exam, he currently feels well on his current regimen of Prozac 40 mg daily.  Plans to lose weight by watching diet and getting back to the gym and exercising this year.  ROS    Past Medical History:   Diagnosis Date    Acid reflux     Allergic     improved      Past Surgical History:   Procedure Laterality Date    CHOLECYSTECTOMY      VASECTOMY       Current Outpatient Medications   Medication Instructions    brompheniramine-pseudoephedrine-DM 30-2-10 MG/5ML syrup 10 mL, Oral, 3 Times Daily PRN    clobetasol (TEMOVATE) 0.05 % ointment Topical, As Needed    FLUoxetine (PROZAC) 40 mg, Oral, Daily    omeprazole (priLOSEC) 20 MG capsule TAKE 1 CAPSULE BY MOUTH EVERY DAY    ondansetron ODT (ZOFRAN ODT) 8 mg, Translingual, Every 8 Hours PRN     No Known Allergies  Family History   Problem Relation Age of Onset    Aneurysm Maternal Grandmother     Aneurysm Paternal Grandmother     Dementia Paternal Grandmother     Heart disease Paternal Grandfather      Social History     Tobacco Use    Smoking status: Former     Current packs/day: 0.25     Average packs/day: 0.3 packs/day for 5.0 years (1.3 ttl pk-yrs)     Types: Cigarettes    Smokeless tobacco: Never   Vaping Use    Vaping status: Never Used   Substance Use Topics    Alcohol use: Not Currently     Comment: quit two months ago    Drug use: No       Objective   Vital Signs:  /82 (BP Location: Left arm, Patient Position: Sitting, Cuff Size: Adult)   Pulse 85   Temp 97.7 °F (36.5 °C) (Temporal)   Ht 165.1 cm (65\")   Wt 112 kg (247 lb)   SpO2 98%   BMI 41.10 kg/m²   Estimated body mass index is 41.1 kg/m² as calculated from the following:    Height as of this encounter: 165.1 cm (65\").    Weight as of this " encounter: 112 kg (247 lb).          Physical Exam  Vitals and nursing note reviewed.   Constitutional:       General: He is not in acute distress.     Appearance: Normal appearance. He is not ill-appearing or diaphoretic.   HENT:      Head: Normocephalic and atraumatic.      Right Ear: Tympanic membrane and external ear normal.      Left Ear: Tympanic membrane and external ear normal.      Nose: Nose normal. No congestion or rhinorrhea.      Mouth/Throat:      Mouth: Mucous membranes are moist.      Pharynx: Oropharynx is clear. No oropharyngeal exudate or posterior oropharyngeal erythema.   Eyes:      General: No scleral icterus.        Right eye: No discharge.         Left eye: No discharge.      Extraocular Movements: Extraocular movements intact.      Conjunctiva/sclera: Conjunctivae normal.      Pupils: Pupils are equal, round, and reactive to light.   Neck:      Thyroid: No thyromegaly.      Vascular: No JVD.      Trachea: No tracheal deviation.   Cardiovascular:      Rate and Rhythm: Normal rate and regular rhythm.      Pulses: Normal pulses.      Heart sounds: Normal heart sounds. No murmur heard.     No friction rub. No gallop.   Pulmonary:      Effort: Pulmonary effort is normal.      Breath sounds: Normal breath sounds.   Abdominal:      General: Bowel sounds are normal. There is no distension.      Palpations: Abdomen is soft. There is no mass.      Tenderness: There is no abdominal tenderness. There is no guarding or rebound.   Musculoskeletal:         General: No deformity or signs of injury.      Cervical back: Neck supple.      Right lower leg: No edema.      Left lower leg: No edema.   Lymphadenopathy:      Cervical: No cervical adenopathy.   Skin:     General: Skin is warm and dry.      Capillary Refill: Capillary refill takes less than 2 seconds.      Coloration: Skin is not jaundiced or pale.   Neurological:      Mental Status: He is alert and oriented to person, place, and time. Mental status  is at baseline.   Psychiatric:         Mood and Affect: Mood normal.         Behavior: Behavior normal.         Thought Content: Thought content normal.         Judgment: Judgment normal.        Result Review :                Assessment and Plan   Diagnoses and all orders for this visit:    1. Annual physical exam (Primary)    2. ANASTACIO (generalized anxiety disorder)  -     FLUoxetine (PROzac) 40 MG capsule; Take 1 capsule by mouth Daily.  Dispense: 30 capsule; Refill: 0    3. Rash and nonspecific skin eruption  -     clobetasol (TEMOVATE) 0.05 % ointment; Apply  topically to the appropriate area as directed As Needed (rash).  Dispense: 60 g; Refill: 2    4. Morbid (severe) obesity due to excess calories  -     Hemoglobin A1c; Future  -     Lipid panel; Future  -     Comprehensive Metabolic Panel; Future    5. Gastroesophageal reflux disease, unspecified whether esophagitis present    Preventative: Encourage weight loss, metabolic screenings above  Refills above  Continue PPI for GERD       Follow Up   Return in about 1 year (around 5/1/2026).  Patient was given instructions and counseling regarding his condition or for health maintenance advice. Please see specific information pulled into the AVS if appropriate.

## 2025-05-11 DIAGNOSIS — F41.1 GAD (GENERALIZED ANXIETY DISORDER): ICD-10-CM

## 2025-05-12 RX ORDER — FLUOXETINE HYDROCHLORIDE 40 MG/1
40 CAPSULE ORAL DAILY
Qty: 30 CAPSULE | Refills: 0 | OUTPATIENT
Start: 2025-05-12

## 2025-06-27 DIAGNOSIS — F41.1 GAD (GENERALIZED ANXIETY DISORDER): ICD-10-CM

## 2025-06-30 RX ORDER — FLUOXETINE HYDROCHLORIDE 40 MG/1
40 CAPSULE ORAL DAILY
Qty: 30 CAPSULE | Refills: 3 | Status: SHIPPED | OUTPATIENT
Start: 2025-06-30

## 2025-08-28 DIAGNOSIS — F41.1 GAD (GENERALIZED ANXIETY DISORDER): ICD-10-CM

## 2025-08-28 RX ORDER — FLUOXETINE HYDROCHLORIDE 40 MG/1
40 CAPSULE ORAL DAILY
Qty: 90 CAPSULE | Refills: 1 | Status: SHIPPED | OUTPATIENT
Start: 2025-08-28